# Patient Record
Sex: MALE | Race: BLACK OR AFRICAN AMERICAN | Employment: UNEMPLOYED
[De-identification: names, ages, dates, MRNs, and addresses within clinical notes are randomized per-mention and may not be internally consistent; named-entity substitution may affect disease eponyms.]

---

## 2024-05-17 ENCOUNTER — APPOINTMENT (OUTPATIENT)
Facility: HOSPITAL | Age: 64
End: 2024-05-17
Payer: MEDICAID

## 2024-05-17 ENCOUNTER — HOSPITAL ENCOUNTER (INPATIENT)
Facility: HOSPITAL | Age: 64
LOS: 7 days | Discharge: NURSING, REHAB OR LONG-TERM CARE | End: 2024-05-24
Attending: EMERGENCY MEDICINE | Admitting: FAMILY MEDICINE
Payer: MEDICAID

## 2024-05-17 DIAGNOSIS — S72.002A CLOSED LEFT HIP FRACTURE, INITIAL ENCOUNTER (HCC): Primary | ICD-10-CM

## 2024-05-17 DIAGNOSIS — R00.0 TACHYARRHYTHMIA: ICD-10-CM

## 2024-05-17 LAB
ALBUMIN SERPL-MCNC: 3.1 G/DL (ref 3.5–5)
ALBUMIN/GLOB SERPL: 0.6 (ref 1.1–2.2)
ALP SERPL-CCNC: 95 U/L (ref 45–117)
ALT SERPL-CCNC: 14 U/L (ref 12–78)
ANION GAP SERPL CALC-SCNC: 8 MMOL/L (ref 5–15)
ANION GAP SERPL CALC-SCNC: 9 MMOL/L (ref 5–15)
ANION GAP SERPL CALC-SCNC: 9 MMOL/L (ref 5–15)
APTT PPP: 29.3 SEC (ref 22.1–31)
AST SERPL-CCNC: 18 U/L (ref 15–37)
BASOPHILS # BLD: 0.1 K/UL (ref 0–0.1)
BASOPHILS NFR BLD: 2 % (ref 0–1)
BILIRUB SERPL-MCNC: 0.4 MG/DL (ref 0.2–1)
BUN SERPL-MCNC: 5 MG/DL (ref 6–20)
BUN SERPL-MCNC: 6 MG/DL (ref 6–20)
BUN SERPL-MCNC: 7 MG/DL (ref 6–20)
BUN/CREAT SERPL: 7 (ref 12–20)
BUN/CREAT SERPL: 8 (ref 12–20)
BUN/CREAT SERPL: 8 (ref 12–20)
CALCIUM SERPL-MCNC: 8 MG/DL (ref 8.5–10.1)
CALCIUM SERPL-MCNC: 8.2 MG/DL (ref 8.5–10.1)
CALCIUM SERPL-MCNC: 8.3 MG/DL (ref 8.5–10.1)
CHLORIDE SERPL-SCNC: 92 MMOL/L (ref 97–108)
CHLORIDE SERPL-SCNC: 93 MMOL/L (ref 97–108)
CHLORIDE SERPL-SCNC: 94 MMOL/L (ref 97–108)
CO2 SERPL-SCNC: 23 MMOL/L (ref 21–32)
CO2 SERPL-SCNC: 23 MMOL/L (ref 21–32)
CO2 SERPL-SCNC: 25 MMOL/L (ref 21–32)
COMMENT:: NORMAL
CREAT SERPL-MCNC: 0.73 MG/DL (ref 0.7–1.3)
CREAT SERPL-MCNC: 0.76 MG/DL (ref 0.7–1.3)
CREAT SERPL-MCNC: 0.91 MG/DL (ref 0.7–1.3)
DIFFERENTIAL METHOD BLD: ABNORMAL
EOSINOPHIL # BLD: 0.2 K/UL (ref 0–0.4)
EOSINOPHIL NFR BLD: 5 % (ref 0–7)
ERYTHROCYTE [DISTWIDTH] IN BLOOD BY AUTOMATED COUNT: 14.9 % (ref 11.5–14.5)
ETHANOL SERPL-MCNC: 240 MG/DL (ref 0–0.08)
GLOBULIN SER CALC-MCNC: 5.1 G/DL (ref 2–4)
GLUCOSE SERPL-MCNC: 118 MG/DL (ref 65–100)
GLUCOSE SERPL-MCNC: 118 MG/DL (ref 65–100)
GLUCOSE SERPL-MCNC: 123 MG/DL (ref 65–100)
HCT VFR BLD AUTO: 31.5 % (ref 36.6–50.3)
HGB BLD-MCNC: 11 G/DL (ref 12.1–17)
IMM GRANULOCYTES # BLD AUTO: 0 K/UL (ref 0–0.04)
IMM GRANULOCYTES NFR BLD AUTO: 1 % (ref 0–0.5)
INR PPP: 1 (ref 0.9–1.1)
LYMPHOCYTES # BLD: 1 K/UL (ref 0.8–3.5)
LYMPHOCYTES NFR BLD: 22 % (ref 12–49)
MCH RBC QN AUTO: 31.6 PG (ref 26–34)
MCHC RBC AUTO-ENTMCNC: 34.9 G/DL (ref 30–36.5)
MCV RBC AUTO: 90.5 FL (ref 80–99)
MONOCYTES # BLD: 0.4 K/UL (ref 0–1)
MONOCYTES NFR BLD: 9 % (ref 5–13)
NEUTS SEG # BLD: 2.7 K/UL (ref 1.8–8)
NEUTS SEG NFR BLD: 61 % (ref 32–75)
NRBC # BLD: 0 K/UL (ref 0–0.01)
NRBC BLD-RTO: 0 PER 100 WBC
OSMOLALITY SERPL: 317 MOSM/KG H2O
OSMOLALITY UR: 378 MOSM/KG H2O
PLATELET # BLD AUTO: 163 K/UL (ref 150–400)
PMV BLD AUTO: 9 FL (ref 8.9–12.9)
POTASSIUM SERPL-SCNC: 3.7 MMOL/L (ref 3.5–5.1)
POTASSIUM SERPL-SCNC: 3.7 MMOL/L (ref 3.5–5.1)
POTASSIUM SERPL-SCNC: 3.8 MMOL/L (ref 3.5–5.1)
PROT SERPL-MCNC: 8.2 G/DL (ref 6.4–8.2)
PROTHROMBIN TIME: 10.3 SEC (ref 9–11.1)
RBC # BLD AUTO: 3.48 M/UL (ref 4.1–5.7)
SODIUM SERPL-SCNC: 124 MMOL/L (ref 136–145)
SODIUM SERPL-SCNC: 126 MMOL/L (ref 136–145)
SODIUM SERPL-SCNC: 126 MMOL/L (ref 136–145)
SODIUM UR-SCNC: 70 MMOL/L
SPECIMEN HOLD: NORMAL
THERAPEUTIC RANGE: NORMAL SECS (ref 58–77)
TSH SERPL DL<=0.05 MIU/L-ACNC: 3.94 UIU/ML (ref 0.36–3.74)
WBC # BLD AUTO: 4.4 K/UL (ref 4.1–11.1)

## 2024-05-17 PROCEDURE — 2580000003 HC RX 258: Performed by: FAMILY MEDICINE

## 2024-05-17 PROCEDURE — 83935 ASSAY OF URINE OSMOLALITY: CPT

## 2024-05-17 PROCEDURE — 82077 ASSAY SPEC XCP UR&BREATH IA: CPT

## 2024-05-17 PROCEDURE — 70450 CT HEAD/BRAIN W/O DYE: CPT

## 2024-05-17 PROCEDURE — 80048 BASIC METABOLIC PNL TOTAL CA: CPT

## 2024-05-17 PROCEDURE — 86850 RBC ANTIBODY SCREEN: CPT

## 2024-05-17 PROCEDURE — 86921 COMPATIBILITY TEST INCUBATE: CPT

## 2024-05-17 PROCEDURE — 71045 X-RAY EXAM CHEST 1 VIEW: CPT

## 2024-05-17 PROCEDURE — 80053 COMPREHEN METABOLIC PANEL: CPT

## 2024-05-17 PROCEDURE — 96374 THER/PROPH/DIAG INJ IV PUSH: CPT

## 2024-05-17 PROCEDURE — 2500000003 HC RX 250 WO HCPCS: Performed by: FAMILY MEDICINE

## 2024-05-17 PROCEDURE — 2580000003 HC RX 258: Performed by: EMERGENCY MEDICINE

## 2024-05-17 PROCEDURE — 86900 BLOOD TYPING SEROLOGIC ABO: CPT

## 2024-05-17 PROCEDURE — 6360000002 HC RX W HCPCS: Performed by: EMERGENCY MEDICINE

## 2024-05-17 PROCEDURE — 86901 BLOOD TYPING SEROLOGIC RH(D): CPT

## 2024-05-17 PROCEDURE — 86922 COMPATIBILITY TEST ANTIGLOB: CPT

## 2024-05-17 PROCEDURE — 86920 COMPATIBILITY TEST SPIN: CPT

## 2024-05-17 PROCEDURE — 85025 COMPLETE CBC W/AUTO DIFF WBC: CPT

## 2024-05-17 PROCEDURE — 86902 BLOOD TYPE ANTIGEN DONOR EA: CPT

## 2024-05-17 PROCEDURE — 73502 X-RAY EXAM HIP UNI 2-3 VIEWS: CPT

## 2024-05-17 PROCEDURE — 86870 RBC ANTIBODY IDENTIFICATION: CPT

## 2024-05-17 PROCEDURE — 99285 EMERGENCY DEPT VISIT HI MDM: CPT

## 2024-05-17 PROCEDURE — APPNB15 APP NON BILLABLE TIME 0-15 MINS: Performed by: PHYSICIAN ASSISTANT

## 2024-05-17 PROCEDURE — 86880 COOMBS TEST DIRECT: CPT

## 2024-05-17 PROCEDURE — 6360000002 HC RX W HCPCS: Performed by: FAMILY MEDICINE

## 2024-05-17 PROCEDURE — 84300 ASSAY OF URINE SODIUM: CPT

## 2024-05-17 PROCEDURE — 73552 X-RAY EXAM OF FEMUR 2/>: CPT

## 2024-05-17 PROCEDURE — 85730 THROMBOPLASTIN TIME PARTIAL: CPT

## 2024-05-17 PROCEDURE — 6370000000 HC RX 637 (ALT 250 FOR IP): Performed by: FAMILY MEDICINE

## 2024-05-17 PROCEDURE — 83930 ASSAY OF BLOOD OSMOLALITY: CPT

## 2024-05-17 PROCEDURE — 86905 BLOOD TYPING RBC ANTIGENS: CPT

## 2024-05-17 PROCEDURE — 36415 COLL VENOUS BLD VENIPUNCTURE: CPT

## 2024-05-17 PROCEDURE — 6370000000 HC RX 637 (ALT 250 FOR IP): Performed by: PHYSICIAN ASSISTANT

## 2024-05-17 PROCEDURE — 6370000000 HC RX 637 (ALT 250 FOR IP): Performed by: ORTHOPAEDIC SURGERY

## 2024-05-17 PROCEDURE — 87040 BLOOD CULTURE FOR BACTERIA: CPT

## 2024-05-17 PROCEDURE — 2060000000 HC ICU INTERMEDIATE R&B

## 2024-05-17 PROCEDURE — 84443 ASSAY THYROID STIM HORMONE: CPT

## 2024-05-17 PROCEDURE — 85610 PROTHROMBIN TIME: CPT

## 2024-05-17 RX ORDER — LORAZEPAM 2 MG/ML
2 INJECTION INTRAMUSCULAR
Status: DISCONTINUED | OUTPATIENT
Start: 2024-05-17 | End: 2024-05-24 | Stop reason: HOSPADM

## 2024-05-17 RX ORDER — ONDANSETRON 4 MG/1
4 TABLET, ORALLY DISINTEGRATING ORAL EVERY 8 HOURS PRN
Status: DISCONTINUED | OUTPATIENT
Start: 2024-05-17 | End: 2024-05-24 | Stop reason: HOSPADM

## 2024-05-17 RX ORDER — POTASSIUM CHLORIDE 750 MG/1
40 TABLET, FILM COATED, EXTENDED RELEASE ORAL PRN
Status: DISCONTINUED | OUTPATIENT
Start: 2024-05-17 | End: 2024-05-24 | Stop reason: HOSPADM

## 2024-05-17 RX ORDER — DEXTROSE MONOHYDRATE, SODIUM CHLORIDE, AND POTASSIUM CHLORIDE 50; 1.49; 9 G/1000ML; G/1000ML; G/1000ML
INJECTION, SOLUTION INTRAVENOUS CONTINUOUS
Status: DISCONTINUED | OUTPATIENT
Start: 2024-05-17 | End: 2024-05-19

## 2024-05-17 RX ORDER — POTASSIUM CHLORIDE 7.45 MG/ML
10 INJECTION INTRAVENOUS PRN
Status: DISCONTINUED | OUTPATIENT
Start: 2024-05-17 | End: 2024-05-24 | Stop reason: HOSPADM

## 2024-05-17 RX ORDER — LORAZEPAM 1 MG/1
1 TABLET ORAL
Status: DISCONTINUED | OUTPATIENT
Start: 2024-05-17 | End: 2024-05-24 | Stop reason: HOSPADM

## 2024-05-17 RX ORDER — MORPHINE SULFATE 4 MG/ML
4 INJECTION, SOLUTION INTRAMUSCULAR; INTRAVENOUS
Status: COMPLETED | OUTPATIENT
Start: 2024-05-17 | End: 2024-05-17

## 2024-05-17 RX ORDER — MULTIVITAMIN WITH IRON
1 TABLET ORAL DAILY
Status: DISCONTINUED | OUTPATIENT
Start: 2024-05-17 | End: 2024-05-24 | Stop reason: HOSPADM

## 2024-05-17 RX ORDER — POLYETHYLENE GLYCOL 3350 17 G/17G
17 POWDER, FOR SOLUTION ORAL DAILY PRN
Status: DISCONTINUED | OUTPATIENT
Start: 2024-05-17 | End: 2024-05-24 | Stop reason: HOSPADM

## 2024-05-17 RX ORDER — SODIUM CHLORIDE 0.9 % (FLUSH) 0.9 %
5-40 SYRINGE (ML) INJECTION EVERY 12 HOURS SCHEDULED
Status: DISCONTINUED | OUTPATIENT
Start: 2024-05-17 | End: 2024-05-22

## 2024-05-17 RX ORDER — LORAZEPAM 1 MG/1
3 TABLET ORAL
Status: DISCONTINUED | OUTPATIENT
Start: 2024-05-17 | End: 2024-05-24 | Stop reason: HOSPADM

## 2024-05-17 RX ORDER — OXYCODONE HYDROCHLORIDE 5 MG/1
5 TABLET ORAL EVERY 4 HOURS PRN
Status: DISCONTINUED | OUTPATIENT
Start: 2024-05-17 | End: 2024-05-17

## 2024-05-17 RX ORDER — LIDOCAINE 4 G/G
1 PATCH TOPICAL DAILY
Status: DISCONTINUED | OUTPATIENT
Start: 2024-05-17 | End: 2024-05-24 | Stop reason: HOSPADM

## 2024-05-17 RX ORDER — 0.9 % SODIUM CHLORIDE 0.9 %
1000 INTRAVENOUS SOLUTION INTRAVENOUS ONCE
Status: COMPLETED | OUTPATIENT
Start: 2024-05-17 | End: 2024-05-17

## 2024-05-17 RX ORDER — OXYCODONE HYDROCHLORIDE 5 MG/1
5 TABLET ORAL EVERY 4 HOURS PRN
Status: DISCONTINUED | OUTPATIENT
Start: 2024-05-17 | End: 2024-05-24 | Stop reason: HOSPADM

## 2024-05-17 RX ORDER — SODIUM CHLORIDE 0.9 % (FLUSH) 0.9 %
5-40 SYRINGE (ML) INJECTION PRN
Status: DISCONTINUED | OUTPATIENT
Start: 2024-05-17 | End: 2024-05-24 | Stop reason: HOSPADM

## 2024-05-17 RX ORDER — SENNOSIDES A AND B 8.6 MG/1
1 TABLET, FILM COATED ORAL DAILY PRN
Status: DISCONTINUED | OUTPATIENT
Start: 2024-05-17 | End: 2024-05-24 | Stop reason: HOSPADM

## 2024-05-17 RX ORDER — ONDANSETRON 2 MG/ML
4 INJECTION INTRAMUSCULAR; INTRAVENOUS EVERY 6 HOURS PRN
Status: DISCONTINUED | OUTPATIENT
Start: 2024-05-17 | End: 2024-05-24 | Stop reason: HOSPADM

## 2024-05-17 RX ORDER — ACETAMINOPHEN 325 MG/1
650 TABLET ORAL EVERY 6 HOURS
Status: DISCONTINUED | OUTPATIENT
Start: 2024-05-17 | End: 2024-05-24 | Stop reason: HOSPADM

## 2024-05-17 RX ORDER — LORAZEPAM 2 MG/ML
1 INJECTION INTRAMUSCULAR
Status: DISCONTINUED | OUTPATIENT
Start: 2024-05-17 | End: 2024-05-24 | Stop reason: HOSPADM

## 2024-05-17 RX ORDER — MORPHINE SULFATE 4 MG/ML
4 INJECTION, SOLUTION INTRAMUSCULAR; INTRAVENOUS EVERY 4 HOURS PRN
Status: DISCONTINUED | OUTPATIENT
Start: 2024-05-17 | End: 2024-05-24 | Stop reason: HOSPADM

## 2024-05-17 RX ORDER — ACETAMINOPHEN 650 MG/1
650 SUPPOSITORY RECTAL EVERY 6 HOURS PRN
Status: DISCONTINUED | OUTPATIENT
Start: 2024-05-17 | End: 2024-05-24 | Stop reason: HOSPADM

## 2024-05-17 RX ORDER — FOLIC ACID 1 MG/1
1 TABLET ORAL DAILY
Status: DISCONTINUED | OUTPATIENT
Start: 2024-05-17 | End: 2024-05-24 | Stop reason: HOSPADM

## 2024-05-17 RX ORDER — LANOLIN ALCOHOL/MO/W.PET/CERES
100 CREAM (GRAM) TOPICAL DAILY
Status: DISCONTINUED | OUTPATIENT
Start: 2024-05-17 | End: 2024-05-24 | Stop reason: HOSPADM

## 2024-05-17 RX ORDER — LORAZEPAM 2 MG/ML
4 INJECTION INTRAMUSCULAR
Status: DISCONTINUED | OUTPATIENT
Start: 2024-05-17 | End: 2024-05-24 | Stop reason: HOSPADM

## 2024-05-17 RX ORDER — NALOXONE HYDROCHLORIDE 0.4 MG/ML
0.4 INJECTION, SOLUTION INTRAMUSCULAR; INTRAVENOUS; SUBCUTANEOUS PRN
Status: DISCONTINUED | OUTPATIENT
Start: 2024-05-17 | End: 2024-05-24 | Stop reason: HOSPADM

## 2024-05-17 RX ORDER — LORAZEPAM 1 MG/1
2 TABLET ORAL
Status: DISCONTINUED | OUTPATIENT
Start: 2024-05-17 | End: 2024-05-24 | Stop reason: HOSPADM

## 2024-05-17 RX ORDER — SODIUM CHLORIDE 9 MG/ML
INJECTION, SOLUTION INTRAVENOUS PRN
Status: DISCONTINUED | OUTPATIENT
Start: 2024-05-17 | End: 2024-05-20 | Stop reason: SDUPTHER

## 2024-05-17 RX ORDER — OXYCODONE HYDROCHLORIDE 5 MG/1
10 TABLET ORAL EVERY 4 HOURS PRN
Status: DISCONTINUED | OUTPATIENT
Start: 2024-05-17 | End: 2024-05-24 | Stop reason: HOSPADM

## 2024-05-17 RX ORDER — ACETAMINOPHEN 325 MG/1
650 TABLET ORAL EVERY 6 HOURS PRN
Status: DISCONTINUED | OUTPATIENT
Start: 2024-05-17 | End: 2024-05-24 | Stop reason: HOSPADM

## 2024-05-17 RX ORDER — MAGNESIUM SULFATE IN WATER 40 MG/ML
2000 INJECTION, SOLUTION INTRAVENOUS PRN
Status: DISCONTINUED | OUTPATIENT
Start: 2024-05-17 | End: 2024-05-24 | Stop reason: HOSPADM

## 2024-05-17 RX ORDER — LORAZEPAM 1 MG/1
4 TABLET ORAL
Status: DISCONTINUED | OUTPATIENT
Start: 2024-05-17 | End: 2024-05-24 | Stop reason: HOSPADM

## 2024-05-17 RX ORDER — LORAZEPAM 2 MG/ML
3 INJECTION INTRAMUSCULAR
Status: DISCONTINUED | OUTPATIENT
Start: 2024-05-17 | End: 2024-05-24 | Stop reason: HOSPADM

## 2024-05-17 RX ORDER — SODIUM CHLORIDE 9 MG/ML
INJECTION, SOLUTION INTRAVENOUS PRN
Status: DISCONTINUED | OUTPATIENT
Start: 2024-05-17 | End: 2024-05-24 | Stop reason: HOSPADM

## 2024-05-17 RX ADMIN — POTASSIUM CHLORIDE, DEXTROSE MONOHYDRATE AND SODIUM CHLORIDE: 150; 5; 900 INJECTION, SOLUTION INTRAVENOUS at 22:28

## 2024-05-17 RX ADMIN — OXYCODONE 10 MG: 5 TABLET ORAL at 22:12

## 2024-05-17 RX ADMIN — ONDANSETRON 4 MG: 2 INJECTION INTRAMUSCULAR; INTRAVENOUS at 21:51

## 2024-05-17 RX ADMIN — ACETAMINOPHEN 650 MG: 325 TABLET ORAL at 23:27

## 2024-05-17 RX ADMIN — POTASSIUM CHLORIDE, DEXTROSE MONOHYDRATE AND SODIUM CHLORIDE: 150; 5; 900 INJECTION, SOLUTION INTRAVENOUS at 07:18

## 2024-05-17 RX ADMIN — OXYCODONE 5 MG: 5 TABLET ORAL at 04:05

## 2024-05-17 RX ADMIN — SODIUM CHLORIDE, PRESERVATIVE FREE 10 ML: 5 INJECTION INTRAVENOUS at 22:15

## 2024-05-17 RX ADMIN — SODIUM CHLORIDE 1000 ML: 9 INJECTION, SOLUTION INTRAVENOUS at 02:47

## 2024-05-17 RX ADMIN — MORPHINE SULFATE 4 MG: 4 INJECTION, SOLUTION INTRAMUSCULAR; INTRAVENOUS at 01:14

## 2024-05-17 RX ADMIN — MORPHINE SULFATE 4 MG: 4 INJECTION, SOLUTION INTRAMUSCULAR; INTRAVENOUS at 13:27

## 2024-05-17 ASSESSMENT — PAIN - FUNCTIONAL ASSESSMENT
PAIN_FUNCTIONAL_ASSESSMENT: PREVENTS OR INTERFERES SOME ACTIVE ACTIVITIES AND ADLS
PAIN_FUNCTIONAL_ASSESSMENT: ACTIVITIES ARE NOT PREVENTED
PAIN_FUNCTIONAL_ASSESSMENT: 0-10
PAIN_FUNCTIONAL_ASSESSMENT: 0-10
PAIN_FUNCTIONAL_ASSESSMENT: PREVENTS OR INTERFERES SOME ACTIVE ACTIVITIES AND ADLS
PAIN_FUNCTIONAL_ASSESSMENT: 0-10
PAIN_FUNCTIONAL_ASSESSMENT: PREVENTS OR INTERFERES SOME ACTIVE ACTIVITIES AND ADLS

## 2024-05-17 ASSESSMENT — PAIN DESCRIPTION - ORIENTATION
ORIENTATION: LEFT
ORIENTATION: RIGHT
ORIENTATION: RIGHT
ORIENTATION: LEFT

## 2024-05-17 ASSESSMENT — PAIN DESCRIPTION - DESCRIPTORS
DESCRIPTORS: DISCOMFORT

## 2024-05-17 ASSESSMENT — PAIN SCALES - GENERAL
PAINLEVEL_OUTOF10: 6
PAINLEVEL_OUTOF10: 5
PAINLEVEL_OUTOF10: 9
PAINLEVEL_OUTOF10: 10
PAINLEVEL_OUTOF10: 4
PAINLEVEL_OUTOF10: 9
PAINLEVEL_OUTOF10: 10
PAINLEVEL_OUTOF10: 9
PAINLEVEL_OUTOF10: 8
PAINLEVEL_OUTOF10: 8

## 2024-05-17 ASSESSMENT — PAIN DESCRIPTION - FREQUENCY
FREQUENCY: CONTINUOUS

## 2024-05-17 ASSESSMENT — PAIN DESCRIPTION - ONSET
ONSET: ON-GOING
ONSET: SUDDEN
ONSET: SUDDEN

## 2024-05-17 ASSESSMENT — PAIN DESCRIPTION - LOCATION
LOCATION: HIP

## 2024-05-17 ASSESSMENT — PAIN DESCRIPTION - PAIN TYPE
TYPE: ACUTE PAIN

## 2024-05-17 ASSESSMENT — PAIN SCALES - WONG BAKER: WONGBAKER_NUMERICALRESPONSE: HURTS LITTLE MORE

## 2024-05-17 NOTE — ED TRIAGE NOTES
Pt arrives via EMS after being found in [facility]. States he is a resident there. Had a fall and states he has left hip pain. Alcoholic beverage discovered with patient by EMS on arrival to scene.

## 2024-05-17 NOTE — PROGRESS NOTES
Orthopedic Perfect Serve Consult Note    Date of Consultation:  May 17, 2024  Referring Physician:  MD Maco    Pt found to be intoxicated with a Left Femoral Neck Fracture in ED; No other injuries and NVI per ED provider; Will see in the morning for formal consult note.     - Plan is to proceed to OR with Dr. Merrill on 5/17 for Left Hip Hemiarthroplasty pending clearance by appropriate services.   - Preop workup ongoing for medical clearance. Pt will be admitted to Hospitalist given advanced age and co-morbidities.   - CBC, CMP, UA, Coags, T&S, EKG, Chest XR ordered for preop workup; addt'l pelvis, femur films ordered for surgical planning  - NPO (except meds with sips) now, Bedrest, NV checks q 4 hours  - Hold chem DVT ppx, SCDs  - Pain - Acetaminophen, Oxycodone, Morphine for breakthrough; Lido patch, Ice      Adryan LORENA Coronel  Orthopedic Trauma Service  [facility]

## 2024-05-17 NOTE — CONSULTS
Orthopaedic Inpatient Consultation  [facility]    Assessment:   Roland Bennett is a 64 y.o. year-old male with left femoral neck Fracture planning surgery    Plan:   -Weighbearing: NWB LLE  -DVT prophylaxis: SCDs, frequent ambulation, hold chemical anticoagulation for surgical planning  -Pain control: Continue as needed pain management, ice to operative area, elevate  -PT/OT for mobilization postoperatively  -Dispo:  -I discussed with the patient his diagnosis and the treatment options.  Because of the displaced fracture, I recommended surgical fixation by hemiarthroplasty.  He is a very complex situation with the presence of the retrograde nail.  This will require removal of the nail from the knee distally followed by the hemiarthroplasty of the hip joint.  We talked about the incapacitation that this diagnosis causes.  We then discussed the risks, benefits, complications, convalescence regarding the surgery.  We talked about the significant impact on healing, recovery and its potential burden on achieving the same level of independence.  We discussed continued pain, dislocation/instability, need for revision surgery, infection, limb length discrepancy, thromboembolic disease and the increased mortality rate both within 90 days and at 1 year as this is a marker of general condition.  I explained that in his situation and his alcoholism, this puts him at higher risk for difficulty controlling his pain around the time of surgery and high risk for complications such as infections, periprosthetic fractures and dislocations.  All the questions were answered.  The patient agreed to proceed forth with surgery  -Planning surgery 5/18/2024 at noon.  - A consent form was signed  - Appropriate surgical site marked  - Hold chemical anticoagulation for surgery  - pain control as needed  - NPO 8 hours prior to surgery     Subjective/History:     Roland Bennett is a 64 y.o. male  evaluated for left hip pain.  He fell in

## 2024-05-17 NOTE — ED PROVIDER NOTES
[facility] EMERGENCY DEP  EMERGENCY DEPARTMENT ENCOUNTER      Pt Name: Roland Bennett  MRN: [id number]  Birthdate 1960  Date of evaluation: 5/17/2024  Provider: Ashutosh Dewey MD    CHIEF COMPLAINT       Chief Complaint   Patient presents with    Fall         HISTORY OF PRESENT ILLNESS   (Location/Symptom, Timing/Onset, Context/Setting, Quality, Duration, Modifying Factors, Severity)  Note limiting factors.   64-year-old male presents with a parking lot at [employer] where he was found on the ground after a fall.  Patient states he had been drinking alcohol tonight.  States he had a trip and fall.  He injured his left hip but denies head injury or any other complaints.  Patient states that he was staying in [facility].  Denies any significant past medical history.  No history available in the EMR.    Paperwork obtained from [facility] show history significant for anemia, alcohol abuse, dysphagia, malnutrition, throat cancer, dysphagia.      The history is provided by the patient, medical records and the EMS personnel.         Review of External Medical Records:     Nursing Notes were reviewed.    REVIEW OF SYSTEMS    (2-9 systems for level 4, 10 or more for level 5)     Review of Systems   Constitutional:  Negative for fatigue.   HENT:  Negative for sore throat.    Eyes:  Negative for visual disturbance.   Respiratory:  Negative for shortness of breath.    Cardiovascular:  Negative for palpitations.   Gastrointestinal:  Negative for constipation.   Genitourinary:  Negative for difficulty urinating.   Musculoskeletal:  Negative for myalgias.   Skin:  Negative for rash.       Except as noted above the remainder of the review of systems was reviewed and negative.       PAST MEDICAL HISTORY   No past medical history on file.      SURGICAL HISTORY     No past surgical history on file.      CURRENT MEDICATIONS       Previous Medications    No medications on file       ALLERGIES     Patient has no known

## 2024-05-17 NOTE — H&P
History and Physical    Date of Service:  5/17/2024  Primary Care Provider: No primary care provider on file.  Source of information: patient, electronic medical record    Chief Complaint: Fall      History of Presenting Illness:   Roland Bennett is a 64 y.o. male with a past medical history of alcohol dependence, anemia, dysphagia, malnutrition, and throat cancer who presented after ground-level fall at [place].  He had been drinking alcohol tonight, and tripped and fell in the parking lot at [place], he injured his left hip, and is being admitted for left hip fracture.  He states that he does not drink daily, but did have 2 drinks consisting of a 40 ounce of beer, and a 24 ounce beers.    In the ED, vital signs stable.  Labs show ethanol 240, hemoglobin 11, and sodium 126.       REVIEW OF SYSTEMS:  A comprehensive review of systems was negative except for that written in the History of Present Illness.     No past medical history on file.   No past surgical history on file.  Prior to Admission medications    Not on File     No Known Allergies   No family history on file.   Social History:     Social Determinants of Health     Tobacco Use: Not on file   Alcohol Use: Not on file   Financial Resource Strain: Not on file   Food Insecurity: Not on file   Transportation Needs: Not on file   Physical Activity: Not on file   Stress: Not on file   Social Connections: Not on file   Intimate Partner Violence: Not on file   Depression: Not on file   Housing Stability: Not on file   Interpersonal Safety: Not on file   Utilities: Not on file        Medications were reconciled to the best of my ability given all available resources at the time of admission. Route is PO if not otherwise noted.     Family and social history were personally reviewed, all pertinent and relevant details are outlined as above.    Objective:   /81   Pulse 57   Resp 12   Ht 1.854 m (6' 1\")   Wt 80.7 kg (178 lb)   SpO2 94%    BMI 23.48 kg/m²         PHYSICAL EXAM:   General: Alert x oriented x 3, awake, no acute distress,   HEENT: PEERL, EOMI, moist mucus membranes  Neck: Supple, no JVD, no meningeal signs  Chest: Clear to auscultation bilaterally   CVS: RRR, S1 S2 heard, no murmurs/rubs/gallops  Abd: Soft, non-tender, non-distended, +bowel sounds   Ext: No clubbing, no cyanosis, no edema  Neuro/Psych: Pleasant mood and affect, CN 2-12 grossly intact, sensory grossly within normal limit, Strength 5/5 in all extremities  Cap refill: Brisk, less than 3 seconds  Pulses: 2+ radial pulses  Skin: Warm, dry, without rashes or lesions    Data Review:   I have independently reviewed and interpreted patient's lab and all other diagnostic data    Abnormal Labs Reviewed   CBC WITH AUTO DIFFERENTIAL - Abnormal; Notable for the following components:       Result Value    RBC 3.48 (*)     Hemoglobin 11.0 (*)     Hematocrit 31.5 (*)     RDW 14.9 (*)     Basophils % 2 (*)     Immature Granulocytes % 1 (*)     All other components within normal limits   COMPREHENSIVE METABOLIC PANEL - Abnormal; Notable for the following components:    Sodium 126 (*)     Chloride 93 (*)     Glucose 118 (*)     BUN/Creatinine Ratio 8 (*)     Calcium 8.3 (*)     Albumin 3.1 (*)     Globulin 5.1 (*)     Albumin/Globulin Ratio 0.6 (*)     All other components within normal limits   ETHANOL - Abnormal; Notable for the following components:    Ethanol Lvl 240 (*)     All other components within normal limits       Results       ** No results found for the last 336 hours. **             IMAGING:   XR HIP 2-3 VW W PELVIS LEFT   Final Result   Acute varus angulated intertrochanteric left femur fracture.      XR CHEST 1 VIEW   Final Result   No acute findings.           ECG/ECHO:    No results found for this or any previous visit (from the past 4464 hour(s)).  No results found for this or any previous visit.        Notes reviewed from all clinical/nonclinical/nursing services

## 2024-05-17 NOTE — ED NOTES
TRANSFER - OUT REPORT:    Verbal report given to SILVIANO Fishman on Roland Bennett  being transferred to [address] [place] [address] for routine progression of patient care       Report consisted of patient's Situation, Background, Assessment and   Recommendations(SBAR).     Information from the following report(s) Nurse Handoff Report, Index, ED Encounter Summary, ED SBAR, Adult Overview, MAR, and Recent Results was reviewed with the receiving nurse.    [place] Fall Assessment:    Presents to emergency department  because of falls (Syncope, seizure, or loss of consciousness): Yes  Age > 70: No  Altered Mental Status, Intoxication with alcohol or substance confusion (Disorientation, impaired judgment, poor safety awaremess, or inability to follow instructions): Yes  Impaired Mobility: Ambulates or transfers with assistive devices or assistance; Unable to ambulate or transer.: Yes  Nursing Judgement: Yes          Lines:   Implantable Port 05/17/24 Left Subclavian (Active)   Port-a-Cath Status Not Accessed 05/17/24 0045   Site Assessment Clean, dry & intact 05/17/24 0045        Opportunity for questions and clarification was provided.      Patient transported with:  Tech

## 2024-05-17 NOTE — DISCHARGE INSTRUCTIONS
Dr. Merrill's Hip Fracture treated by Hemiarthroplasty Postoperative Instructions    Follow-Up Appointment:  Follow up in the office 2 weeks after surgery.  To schedule an appointment, please call our office at [phone], extension [phone].   Activity:  Application of ice regularly will prevent inflammation and reduce pain and swelling.  You should ice the area as much as tolerated, but at least 3 times per day for 20-30 minutes.  Unless you have been specifically instructed otherwise, you can advance your activity as tolerated.   Ambulate every hour. Use your incentive spirometer every half hour when resting.  Perform your exercises as often as possible, at least 3 times per day.  Avoid extremes of motion and vigorous activities.  Hip Precautions: Avoid planting your operative foot and rotating (turning) at the hip. Also, keep your toes pointing relatively straightforward without excessive inward or outward turn. Avoid bringing your knee past your belly button.   Avoid low seats, recliners, and bleachers for the first 6-8 weeks  You may bear full weight on your operative extremity with a walker and transition to a cane as soon as you feel comfortable and strong enough.  Advance your activity in a stepwise and cautious manner.  Refrain from any high-level activities until we see you back at your follow up appointment. This includes golfing, exercising, and other more intense activities.  Prevent any falls. Clear your living environment of rugs or floor objects that may be tripping hazards. Use an assistive device as needed for balance and support.  Dressings / Wound Care:  Keep dressing in place until the follow-up visit.   Do not apply antibiotic ointment, creams, or lotions to your incision.  Most incisions are closed with absorbable sutures, but if not, the sutures or staples will be removed at your 2 week follow up.  Dermabond (skin glue) may be used to help close the incision, which appears as a thin,  you have questions or concerns, please call Dr. Merrill's staff    Office: Phone [phone], ext [phone]  Fax [phone]    Office Address: Flakito Merrill M.D.    [place] [id number]    Flakito Merrill MD      05/17/24         PREVENA PLUS incisional dressing management:    -This is an INCISIONAL “band-aid” under suction - no open wound and easily peels off at end of therapy.    -Dressing stays on for 7 days days with uninterrupted therapy; stays under suction 24/7 - do NOT turn it off.    -The pump has a rechargeable battery like a cell phone and must be plugged in to recharge.      -The [place] of lights will count down the days of therapy before the pump turns off automatically.     -At end of therapy, remove the dressing and cover incision with dry gauze until follow up appt with physician; dispose of dressing and pump.    -An extra canister was provided and can be exchanged when needed.    -You may add tape to the outside edge if it rolls up.     -Patient may shower with dressing - face the water stream; blot dressing dry with towel rather than rubbing which may roll up tape edges.    -Please reach out to provider/surgeon's office for concerns with incision.

## 2024-05-17 NOTE — CARE COORDINATION
Care Management Initial Assessment       RUR:  Readmission? No  1st IM letter given? No  1st  letter given: No     05/17/24 1121   Service Assessment   Patient Orientation Alert and Oriented   Cognition Alert   History Provided By Patient   Primary Caregiver Other (Comment)  (resides in [facility])   PCP Verified by CM Yes   Last Visit to PCP Within last 3 months   Prior Functional Level Assistance with the following:;Mobility   Current Functional Level Assistance with the following:;Mobility   Can patient return to prior living arrangement Yes   Ability to make needs known: Good   Would you like for me to discuss the discharge plan with any other family members/significant others, and if so, who? No   Social/Functional History   Lives With Other (comment)  (LTC at [facility])   Type of Home Facility   Bathroom Accessibility Walker accessible   Receives Help From Other (comment)  (staff at the facility)   Ambulation Assistance Needs assistance   Transfer Assistance Needs assistance  (walker)   Active  No   Occupation On disability   Discharge Planning   Type of Residence Long-Term Care;Other (Comment)  ([facility])   Living Arrangements Spouse/Significant Other  (Racheal Ramos-girlfriend [phone])   Potential Assistance Purchasing Medications No   Patient expects to be discharged to: Long-term care   History of falls? 1   Services At/After Discharge   Transition of Care Consult (CM Consult) Long Term Care   Mode of Transport at Discharge  Van   Confirm Follow Up Transport Self   Condition of Participation: Discharge Planning   The Patient and/or Patient Representative was provided with a Choice of Provider? Patient   The Patient and/Or Patient Representative agree with the Discharge Plan? Yes   Freedom of Choice list was provided with basic dialogue that supports the patient's individualized plan of care/goals, treatment preferences, and shares the quality data associated with the  providers?  Yes     Admitted after taking a fail in the parking lot of [facility]. He resides in the facility in LTC. Patient reports having drank alcohol prior to fall. CM confirmed with admissions at the facility that he has been there for at least 2 years in LTC. Emergency contact in Racheal Ramos, girlfriend [phone].   Has fractured left hip and plan for surgical repair today 5/17/24. CM following for ERIKA needs.

## 2024-05-17 NOTE — PROGRESS NOTES
[facility]                                                                                          Hospitalist Progress Note  Yesica Romero PA-C  Answering service: [phone] OR [phone] from in house phone        Date of Service:  2024  NAME:  Roland Bennett  :  1960  MRN:  [id number]       Admission Summary:   Roland Bennett is a 64 y.o. male with a past medical history of alcohol dependence, anemia, dysphagia, malnutrition, and throat cancer who presented after ground-level fall at [facility].  Patient had been drinking alcohol, tripped and fell in the parking lot at [facility], and injured his left hip. Upon arrival to the ED, he was found to have a left hip fracture. He states that he does not drink daily, but did have 2 drinks consisting of a 40 ounce of beer, and a 24 ounce beers.     In the ED, vital signs stable. Labs show ethanol 240, hemoglobin 11, and sodium 126.    Interval history / Subjective:   Upon seeing the patient on rounds, he is laying back in bed sleeping. Patient was easily aroused. Patient states he feels \"pretty good but I broke my hip!\" Patient states he has sharp intermittent pain in his hip, but generally is comfortable. Patient states his port he received when he had radiation and chemo for his throat cancer. Patient denies any fevers, chills and abdominal pain.     Assessment & Plan:      Left Femoral Neck Fracture  -Status post ground-level fall while intoxicated with alcohol  -Ortho following: plan to proceed to OR with Dr. Merrill on  for Left Hip Hemiarthroplasty  -nocontrast CT head  -NPO  -Bedrest  -Pain control: Acetaminophen, Oxycodone, Morphine for breakthrough; Lido patch, Ice  -Neurovascular checks q4hrs  -At present, there are no medical concerns that would keep patient from being amenable to surgery.     ETOH Dependence  -Alcohol level 240  -CIWA with as needed Ativan  -Thiamine plus folate plus MVI

## 2024-05-17 NOTE — PROGRESS NOTES
0542: patient arrived to floor, port accessed in ER, messaged Dr. Whitaker for order or instruction to deaccess, not sure if patient is an accurate historian for admit questions based on conversation upon transfer    0550: Xray needing nursing to leave for multiple images    0610: confirmed with Dr. Whitaker that port use is okay,  asked nurse to place order and she would cosign, I placed the order to the best of my knowledge. *hospitalists please update if it is incorrect or if your opinion differs on use*    0646: Prioritizing meds and labs, not sure if patient is sober/able to be an accurate source for admit questions currently, leaving admission questions for day shift.    0710: patient refusing PO medications/he is unwilling to raise head of bed to take PO meds, I am not confident nor the patient in him not choking on PO meds supine.    0720: patient not really cooperating with questions, \"I'm sleeping man\" and then ignoring/sleeping again

## 2024-05-17 NOTE — PROGRESS NOTES
Bedside and Verbal shift change report given to Jen (oncoming nurse) by Cristina (offgoing nurse). Report included the following information Nurse Handoff Report, Index, Intake/Output, MAR, and Cardiac Rhythm SR .

## 2024-05-17 NOTE — CONSULTS
ORTHOPEDIC SURGERY CONSULT    Subjective:     Date of Consultation:  May 17, 2024    Roland Bennett is a 64 y.o. male who is being seen for left hip fracture. Injury occurred in the early morning hours today. He tells me he was drinking heavily and fell onto his left side. He denies any other injuries. Denies head trauma/LOC during injury. Xrays of the left femur show a displaced left femoral neck fracture. He has a long intramedullary nail already in the left femur as a result of a femoral shaft fracture when he was a teenager. He reports fracturing his contralateral hip about 3 years ago. At baseline he walks without assistive device. He reports no significant medical issues and takes no medication regularly.    Patient Active Problem List    Diagnosis Date Noted    Closed left hip fracture, initial encounter ([facility]) 05/17/2024     No family history on file.   Social History     Tobacco Use    Smoking status: Never    Smokeless tobacco: Never   Substance Use Topics    Alcohol use: Yes     No past medical history on file.   No past surgical history on file.   Prior to Admission medications    Not on File     Current Facility-Administered Medications   Medication Dose Route Frequency    sodium chloride flush 0.9 % injection 5-40 mL  5-40 mL IntraVENous 2 times per day    sodium chloride flush 0.9 % injection 5-40 mL  5-40 mL IntraVENous PRN    0.9 % sodium chloride infusion   IntraVENous PRN    potassium chloride (KLOR-CON) extended release tablet 40 mEq  40 mEq Oral PRN    Or    potassium bicarb-citric acid (EFFER-K) effervescent tablet 40 mEq  40 mEq Oral PRN    Or    potassium chloride 10 mEq/100 mL IVPB (Peripheral Line)  10 mEq IntraVENous PRN    magnesium sulfate 2000 mg in 50 mL IVPB premix  2,000 mg IntraVENous PRN    ondansetron (ZOFRAN-ODT) disintegrating tablet 4 mg  4 mg Oral Q8H PRN    Or    ondansetron (ZOFRAN) injection 4 mg  4 mg IntraVENous Q6H PRN    polyethylene glycol (GLYCOLAX) packet 17 g  17 g  Eosinophils Absolute 0.2 0.0 - 0.4 K/UL    Basophils Absolute 0.1 0.0 - 0.1 K/UL    Immature Granulocytes Absolute 0.0 0.00 - 0.04 K/UL    Differential Type AUTOMATED     CMP    Collection Time: 05/17/24  1:17 AM   Result Value Ref Range    Sodium 126 (L) 136 - 145 mmol/L    Potassium 3.8 3.5 - 5.1 mmol/L    Chloride 93 (L) 97 - 108 mmol/L    CO2 25 21 - 32 mmol/L    Anion Gap 8 5 - 15 mmol/L    Glucose 118 (H) 65 - 100 mg/dL    BUN 7 6 - 20 MG/DL    Creatinine 0.91 0.70 - 1.30 MG/DL    BUN/Creatinine Ratio 8 (L) 12 - 20      Est, Glom Filt Rate >90 >60 ml/min/1.73m2    Calcium 8.3 (L) 8.5 - 10.1 MG/DL    Total Bilirubin 0.4 0.2 - 1.0 MG/DL    ALT 14 12 - 78 U/L    AST 18 15 - 37 U/L    Alk Phosphatase 95 45 - 117 U/L    Total Protein 8.2 6.4 - 8.2 g/dL    Albumin 3.1 (L) 3.5 - 5.0 g/dL    Globulin 5.1 (H) 2.0 - 4.0 g/dL    Albumin/Globulin Ratio 0.6 (L) 1.1 - 2.2     Protime-INR    Collection Time: 05/17/24  1:17 AM   Result Value Ref Range    INR 1.0 0.9 - 1.1      Protime 10.3 9.0 - 11.1 sec   Osmolality    Collection Time: 05/17/24  1:17 AM   Result Value Ref Range    Serum Osmolality 317 mOsm/kg H2O   TSH    Collection Time: 05/17/24  1:17 AM   Result Value Ref Range    TSH, 3rd Generation 3.94 (H) 0.36 - 3.74 uIU/mL   Sodium, urine, random    Collection Time: 05/17/24  6:11 AM   Result Value Ref Range    SODIUM, RANDOM URINE 70 MMOL/L   Osmolality, Urine    Collection Time: 05/17/24  6:11 AM   Result Value Ref Range    Osmolality, Ur 378 MOSM/kg H2O   Basic Metabolic Panel w/ Reflex to MG    Collection Time: 05/17/24  6:11 AM   Result Value Ref Range    Sodium 126 (L) 136 - 145 mmol/L    Potassium 3.7 3.5 - 5.1 mmol/L    Chloride 94 (L) 97 - 108 mmol/L    CO2 23 21 - 32 mmol/L    Anion Gap 9 5 - 15 mmol/L    Glucose 123 (H) 65 - 100 mg/dL    BUN 6 6 - 20 MG/DL    Creatinine 0.76 0.70 - 1.30 MG/DL    BUN/Creatinine Ratio 8 (L) 12 - 20      Est, Glom Filt Rate >90 >60 ml/min/1.73m2    Calcium 8.0 (L) 8.5 - 10.1

## 2024-05-18 ENCOUNTER — ANESTHESIA (OUTPATIENT)
Facility: HOSPITAL | Age: 64
End: 2024-05-18
Payer: MEDICAID

## 2024-05-18 ENCOUNTER — APPOINTMENT (OUTPATIENT)
Facility: HOSPITAL | Age: 64
End: 2024-05-18
Payer: MEDICAID

## 2024-05-18 ENCOUNTER — ANESTHESIA EVENT (OUTPATIENT)
Facility: HOSPITAL | Age: 64
End: 2024-05-18
Payer: MEDICAID

## 2024-05-18 LAB
ANION GAP SERPL CALC-SCNC: 4 MMOL/L (ref 5–15)
ANION GAP SERPL CALC-SCNC: 5 MMOL/L (ref 5–15)
BASOPHILS # BLD: 0.1 K/UL (ref 0–0.1)
BASOPHILS NFR BLD: 1 % (ref 0–1)
BUN SERPL-MCNC: 7 MG/DL (ref 6–20)
BUN SERPL-MCNC: 8 MG/DL (ref 6–20)
BUN/CREAT SERPL: 9 (ref 12–20)
BUN/CREAT SERPL: 9 (ref 12–20)
CALCIUM SERPL-MCNC: 8.2 MG/DL (ref 8.5–10.1)
CALCIUM SERPL-MCNC: 8.3 MG/DL (ref 8.5–10.1)
CHLORIDE SERPL-SCNC: 100 MMOL/L (ref 97–108)
CHLORIDE SERPL-SCNC: 98 MMOL/L (ref 97–108)
CO2 SERPL-SCNC: 23 MMOL/L (ref 21–32)
CO2 SERPL-SCNC: 25 MMOL/L (ref 21–32)
CREAT SERPL-MCNC: 0.76 MG/DL (ref 0.7–1.3)
CREAT SERPL-MCNC: 0.85 MG/DL (ref 0.7–1.3)
DIFFERENTIAL METHOD BLD: ABNORMAL
EOSINOPHIL # BLD: 0.2 K/UL (ref 0–0.4)
EOSINOPHIL NFR BLD: 3 % (ref 0–7)
ERYTHROCYTE [DISTWIDTH] IN BLOOD BY AUTOMATED COUNT: 14.6 % (ref 11.5–14.5)
GLUCOSE SERPL-MCNC: 117 MG/DL (ref 65–100)
GLUCOSE SERPL-MCNC: 120 MG/DL (ref 65–100)
HCT VFR BLD AUTO: 34.1 % (ref 36.6–50.3)
HGB BLD-MCNC: 11.7 G/DL (ref 12.1–17)
IMM GRANULOCYTES # BLD AUTO: 0.1 K/UL (ref 0–0.04)
IMM GRANULOCYTES NFR BLD AUTO: 1 % (ref 0–0.5)
LYMPHOCYTES # BLD: 0.5 K/UL (ref 0.8–3.5)
LYMPHOCYTES NFR BLD: 9 % (ref 12–49)
MCH RBC QN AUTO: 31.1 PG (ref 26–34)
MCHC RBC AUTO-ENTMCNC: 34.3 G/DL (ref 30–36.5)
MCV RBC AUTO: 90.7 FL (ref 80–99)
MONOCYTES # BLD: 0.6 K/UL (ref 0–1)
MONOCYTES NFR BLD: 10 % (ref 5–13)
NEUTS SEG # BLD: 4.2 K/UL (ref 1.8–8)
NEUTS SEG NFR BLD: 76 % (ref 32–75)
NRBC # BLD: 0 K/UL (ref 0–0.01)
NRBC BLD-RTO: 0 PER 100 WBC
PLATELET # BLD AUTO: 140 K/UL (ref 150–400)
PMV BLD AUTO: 9.7 FL (ref 8.9–12.9)
POTASSIUM SERPL-SCNC: 4.4 MMOL/L (ref 3.5–5.1)
POTASSIUM SERPL-SCNC: 4.4 MMOL/L (ref 3.5–5.1)
RBC # BLD AUTO: 3.76 M/UL (ref 4.1–5.7)
RBC MORPH BLD: ABNORMAL
SODIUM SERPL-SCNC: 126 MMOL/L (ref 136–145)
SODIUM SERPL-SCNC: 129 MMOL/L (ref 136–145)
WBC # BLD AUTO: 5.7 K/UL (ref 4.1–11.1)

## 2024-05-18 PROCEDURE — 2580000003 HC RX 258: Performed by: ORTHOPAEDIC SURGERY

## 2024-05-18 PROCEDURE — 2580000003 HC RX 258: Performed by: PHYSICIAN ASSISTANT

## 2024-05-18 PROCEDURE — 72170 X-RAY EXAM OF PELVIS: CPT

## 2024-05-18 PROCEDURE — 85025 COMPLETE CBC W/AUTO DIFF WBC: CPT

## 2024-05-18 PROCEDURE — 3700000000 HC ANESTHESIA ATTENDED CARE: Performed by: ORTHOPAEDIC SURGERY

## 2024-05-18 PROCEDURE — 3600000015 HC SURGERY LEVEL 5 ADDTL 15MIN: Performed by: ORTHOPAEDIC SURGERY

## 2024-05-18 PROCEDURE — 6370000000 HC RX 637 (ALT 250 FOR IP): Performed by: PHYSICIAN ASSISTANT

## 2024-05-18 PROCEDURE — 36415 COLL VENOUS BLD VENIPUNCTURE: CPT

## 2024-05-18 PROCEDURE — C9290 INJ, BUPIVACAINE LIPOSOME: HCPCS | Performed by: ORTHOPAEDIC SURGERY

## 2024-05-18 PROCEDURE — 6360000002 HC RX W HCPCS: Performed by: ORTHOPAEDIC SURGERY

## 2024-05-18 PROCEDURE — 2580000003 HC RX 258: Performed by: FAMILY MEDICINE

## 2024-05-18 PROCEDURE — 7100000000 HC PACU RECOVERY - FIRST 15 MIN: Performed by: ORTHOPAEDIC SURGERY

## 2024-05-18 PROCEDURE — 80048 BASIC METABOLIC PNL TOTAL CA: CPT

## 2024-05-18 PROCEDURE — 6360000002 HC RX W HCPCS: Performed by: PHYSICIAN ASSISTANT

## 2024-05-18 PROCEDURE — C1776 JOINT DEVICE (IMPLANTABLE): HCPCS | Performed by: ORTHOPAEDIC SURGERY

## 2024-05-18 PROCEDURE — 2720000010 HC SURG SUPPLY STERILE: Performed by: ORTHOPAEDIC SURGERY

## 2024-05-18 PROCEDURE — 2500000003 HC RX 250 WO HCPCS: Performed by: NURSE ANESTHETIST, CERTIFIED REGISTERED

## 2024-05-18 PROCEDURE — 3700000001 HC ADD 15 MINUTES (ANESTHESIA): Performed by: ORTHOPAEDIC SURGERY

## 2024-05-18 PROCEDURE — 2060000000 HC ICU INTERMEDIATE R&B

## 2024-05-18 PROCEDURE — 0QP704Z REMOVAL OF INTERNAL FIXATION DEVICE FROM LEFT UPPER FEMUR, OPEN APPROACH: ICD-10-PCS | Performed by: ORTHOPAEDIC SURGERY

## 2024-05-18 PROCEDURE — 2709999900 HC NON-CHARGEABLE SUPPLY: Performed by: ORTHOPAEDIC SURGERY

## 2024-05-18 PROCEDURE — 3600000005 HC SURGERY LEVEL 5 BASE: Performed by: ORTHOPAEDIC SURGERY

## 2024-05-18 PROCEDURE — 6370000000 HC RX 637 (ALT 250 FOR IP): Performed by: FAMILY MEDICINE

## 2024-05-18 PROCEDURE — 0SRS0JZ REPLACEMENT OF LEFT HIP JOINT, FEMORAL SURFACE WITH SYNTHETIC SUBSTITUTE, OPEN APPROACH: ICD-10-PCS | Performed by: ORTHOPAEDIC SURGERY

## 2024-05-18 PROCEDURE — 6360000002 HC RX W HCPCS: Performed by: NURSE ANESTHETIST, CERTIFIED REGISTERED

## 2024-05-18 PROCEDURE — 7100000001 HC PACU RECOVERY - ADDTL 15 MIN: Performed by: ORTHOPAEDIC SURGERY

## 2024-05-18 PROCEDURE — 6370000000 HC RX 637 (ALT 250 FOR IP): Performed by: ORTHOPAEDIC SURGERY

## 2024-05-18 PROCEDURE — 2580000003 HC RX 258: Performed by: NURSE ANESTHETIST, CERTIFIED REGISTERED

## 2024-05-18 DEVICE — IMPL CAPPED H6 HEMI UNI/BIPOLAR DEPUYSYNTHES: Type: IMPLANTABLE DEVICE | Site: HIP | Status: FUNCTIONAL

## 2024-05-18 DEVICE — SELF CENTERING BI-POLAR HEAD 28MM ID 53MM OD
Type: IMPLANTABLE DEVICE | Site: HIP | Status: FUNCTIONAL
Brand: SELF CENTERING

## 2024-05-18 DEVICE — ARTICUL/EZE FEMORAL HEAD DIAMETER 28MM +8.5 12/14 TAPER
Type: IMPLANTABLE DEVICE | Site: HIP | Status: FUNCTIONAL
Brand: ARTICUL/EZE

## 2024-05-18 DEVICE — ACTIS DUOFIX HIP PROSTHESIS (FEMORAL STEM 12/14 TAPER CEMENTLESS SIZE 11 STD COLLAR)  CE
Type: IMPLANTABLE DEVICE | Site: HIP | Status: FUNCTIONAL
Brand: ACTIS

## 2024-05-18 RX ORDER — 0.9 % SODIUM CHLORIDE 0.9 %
500 INTRAVENOUS SOLUTION INTRAVENOUS ONCE
Status: CANCELLED | OUTPATIENT
Start: 2024-05-18 | End: 2024-05-18

## 2024-05-18 RX ORDER — FENTANYL CITRATE 50 UG/ML
25 INJECTION, SOLUTION INTRAMUSCULAR; INTRAVENOUS EVERY 5 MIN PRN
Status: DISCONTINUED | OUTPATIENT
Start: 2024-05-18 | End: 2024-05-18 | Stop reason: HOSPADM

## 2024-05-18 RX ORDER — SODIUM CHLORIDE 9 MG/ML
INJECTION, SOLUTION INTRAVENOUS PRN
Status: CANCELLED | OUTPATIENT
Start: 2024-05-18

## 2024-05-18 RX ORDER — DEXAMETHASONE SODIUM PHOSPHATE 4 MG/ML
INJECTION, SOLUTION INTRA-ARTICULAR; INTRALESIONAL; INTRAMUSCULAR; INTRAVENOUS; SOFT TISSUE PRN
Status: DISCONTINUED | OUTPATIENT
Start: 2024-05-18 | End: 2024-05-18 | Stop reason: SDUPTHER

## 2024-05-18 RX ORDER — SODIUM CHLORIDE 0.9 % (FLUSH) 0.9 %
5-40 SYRINGE (ML) INJECTION PRN
Status: DISCONTINUED | OUTPATIENT
Start: 2024-05-18 | End: 2024-05-18 | Stop reason: HOSPADM

## 2024-05-18 RX ORDER — HYDROXYZINE HYDROCHLORIDE 10 MG/1
10 TABLET, FILM COATED ORAL EVERY 8 HOURS PRN
Status: CANCELLED | OUTPATIENT
Start: 2024-05-18

## 2024-05-18 RX ORDER — SUCCINYLCHOLINE/SOD CL,ISO/PF 200MG/10ML
SYRINGE (ML) INTRAVENOUS PRN
Status: DISCONTINUED | OUTPATIENT
Start: 2024-05-18 | End: 2024-05-18 | Stop reason: SDUPTHER

## 2024-05-18 RX ORDER — ONDANSETRON 2 MG/ML
4 INJECTION INTRAMUSCULAR; INTRAVENOUS EVERY 6 HOURS PRN
Status: CANCELLED | OUTPATIENT
Start: 2024-05-18

## 2024-05-18 RX ORDER — OXYCODONE HYDROCHLORIDE 5 MG/1
5 TABLET ORAL
Status: DISCONTINUED | OUTPATIENT
Start: 2024-05-18 | End: 2024-05-18 | Stop reason: HOSPADM

## 2024-05-18 RX ORDER — PROCHLORPERAZINE EDISYLATE 5 MG/ML
5 INJECTION INTRAMUSCULAR; INTRAVENOUS
Status: DISCONTINUED | OUTPATIENT
Start: 2024-05-18 | End: 2024-05-18 | Stop reason: HOSPADM

## 2024-05-18 RX ORDER — HYDROMORPHONE HYDROCHLORIDE 1 MG/ML
0.5 INJECTION, SOLUTION INTRAMUSCULAR; INTRAVENOUS; SUBCUTANEOUS EVERY 5 MIN PRN
Status: DISCONTINUED | OUTPATIENT
Start: 2024-05-18 | End: 2024-05-18 | Stop reason: HOSPADM

## 2024-05-18 RX ORDER — HYDRALAZINE HYDROCHLORIDE 20 MG/ML
10 INJECTION INTRAMUSCULAR; INTRAVENOUS
Status: DISCONTINUED | OUTPATIENT
Start: 2024-05-18 | End: 2024-05-18 | Stop reason: HOSPADM

## 2024-05-18 RX ORDER — ENOXAPARIN SODIUM 100 MG/ML
40 INJECTION SUBCUTANEOUS DAILY
Status: CANCELLED | OUTPATIENT
Start: 2024-05-18

## 2024-05-18 RX ORDER — FENTANYL CITRATE 50 UG/ML
INJECTION, SOLUTION INTRAMUSCULAR; INTRAVENOUS PRN
Status: DISCONTINUED | OUTPATIENT
Start: 2024-05-18 | End: 2024-05-18 | Stop reason: SDUPTHER

## 2024-05-18 RX ORDER — PHENYLEPHRINE HCL IN 0.9% NACL 0.4MG/10ML
SYRINGE (ML) INTRAVENOUS PRN
Status: DISCONTINUED | OUTPATIENT
Start: 2024-05-18 | End: 2024-05-18 | Stop reason: SDUPTHER

## 2024-05-18 RX ORDER — VANCOMYCIN HYDROCHLORIDE 1 G/20ML
INJECTION, POWDER, LYOPHILIZED, FOR SOLUTION INTRAVENOUS PRN
Status: DISCONTINUED | OUTPATIENT
Start: 2024-05-18 | End: 2024-05-18 | Stop reason: HOSPADM

## 2024-05-18 RX ORDER — ONDANSETRON 2 MG/ML
INJECTION INTRAMUSCULAR; INTRAVENOUS PRN
Status: DISCONTINUED | OUTPATIENT
Start: 2024-05-18 | End: 2024-05-18 | Stop reason: SDUPTHER

## 2024-05-18 RX ORDER — POLYETHYLENE GLYCOL 3350 17 G/17G
17 POWDER, FOR SOLUTION ORAL DAILY
Status: CANCELLED | OUTPATIENT
Start: 2024-05-18

## 2024-05-18 RX ORDER — SODIUM CHLORIDE 0.9 % (FLUSH) 0.9 %
5-40 SYRINGE (ML) INJECTION EVERY 12 HOURS SCHEDULED
Status: DISCONTINUED | OUTPATIENT
Start: 2024-05-18 | End: 2024-05-18 | Stop reason: HOSPADM

## 2024-05-18 RX ORDER — ONDANSETRON 2 MG/ML
4 INJECTION INTRAMUSCULAR; INTRAVENOUS
Status: DISCONTINUED | OUTPATIENT
Start: 2024-05-18 | End: 2024-05-18 | Stop reason: HOSPADM

## 2024-05-18 RX ORDER — NALOXONE HYDROCHLORIDE 0.4 MG/ML
INJECTION, SOLUTION INTRAMUSCULAR; INTRAVENOUS; SUBCUTANEOUS PRN
Status: DISCONTINUED | OUTPATIENT
Start: 2024-05-18 | End: 2024-05-18 | Stop reason: HOSPADM

## 2024-05-18 RX ORDER — SODIUM CHLORIDE, SODIUM LACTATE, POTASSIUM CHLORIDE, CALCIUM CHLORIDE 600; 310; 30; 20 MG/100ML; MG/100ML; MG/100ML; MG/100ML
INJECTION, SOLUTION INTRAVENOUS CONTINUOUS PRN
Status: DISCONTINUED | OUTPATIENT
Start: 2024-05-18 | End: 2024-05-18 | Stop reason: SDUPTHER

## 2024-05-18 RX ORDER — LIDOCAINE HYDROCHLORIDE 20 MG/ML
INJECTION, SOLUTION EPIDURAL; INFILTRATION; INTRACAUDAL; PERINEURAL PRN
Status: DISCONTINUED | OUTPATIENT
Start: 2024-05-18 | End: 2024-05-18 | Stop reason: SDUPTHER

## 2024-05-18 RX ORDER — GLYCOPYRROLATE 0.2 MG/ML
INJECTION, SOLUTION INTRAMUSCULAR; INTRAVENOUS PRN
Status: DISCONTINUED | OUTPATIENT
Start: 2024-05-18 | End: 2024-05-18 | Stop reason: SDUPTHER

## 2024-05-18 RX ORDER — SODIUM CHLORIDE 0.9 % (FLUSH) 0.9 %
5-40 SYRINGE (ML) INJECTION EVERY 12 HOURS SCHEDULED
Status: CANCELLED | OUTPATIENT
Start: 2024-05-18

## 2024-05-18 RX ORDER — NEOSTIGMINE METHYLSULFATE 1 MG/ML
INJECTION, SOLUTION INTRAVENOUS PRN
Status: DISCONTINUED | OUTPATIENT
Start: 2024-05-18 | End: 2024-05-18 | Stop reason: SDUPTHER

## 2024-05-18 RX ORDER — ROCURONIUM BROMIDE 10 MG/ML
INJECTION, SOLUTION INTRAVENOUS PRN
Status: DISCONTINUED | OUTPATIENT
Start: 2024-05-18 | End: 2024-05-18 | Stop reason: SDUPTHER

## 2024-05-18 RX ORDER — SODIUM CHLORIDE 0.9 % (FLUSH) 0.9 %
5-40 SYRINGE (ML) INJECTION PRN
Status: CANCELLED | OUTPATIENT
Start: 2024-05-18

## 2024-05-18 RX ORDER — ONDANSETRON 4 MG/1
4 TABLET, ORALLY DISINTEGRATING ORAL EVERY 8 HOURS PRN
Status: CANCELLED | OUTPATIENT
Start: 2024-05-18

## 2024-05-18 RX ORDER — MIDAZOLAM HYDROCHLORIDE 1 MG/ML
INJECTION INTRAMUSCULAR; INTRAVENOUS PRN
Status: DISCONTINUED | OUTPATIENT
Start: 2024-05-18 | End: 2024-05-18 | Stop reason: SDUPTHER

## 2024-05-18 RX ORDER — SODIUM CHLORIDE 9 MG/ML
INJECTION, SOLUTION INTRAVENOUS CONTINUOUS
Status: DISPENSED | OUTPATIENT
Start: 2024-05-18 | End: 2024-05-19

## 2024-05-18 RX ORDER — SODIUM CHLORIDE 9 MG/ML
INJECTION, SOLUTION INTRAVENOUS PRN
Status: DISCONTINUED | OUTPATIENT
Start: 2024-05-18 | End: 2024-05-18 | Stop reason: HOSPADM

## 2024-05-18 RX ORDER — SENNA AND DOCUSATE SODIUM 50; 8.6 MG/1; MG/1
1 TABLET, FILM COATED ORAL 2 TIMES DAILY
Status: CANCELLED | OUTPATIENT
Start: 2024-05-18

## 2024-05-18 RX ADMIN — PROPOFOL 100 MG: 10 INJECTION, EMULSION INTRAVENOUS at 13:12

## 2024-05-18 RX ADMIN — ROCURONIUM BROMIDE 40 MG: 10 SOLUTION INTRAVENOUS at 13:17

## 2024-05-18 RX ADMIN — SODIUM CHLORIDE, PRESERVATIVE FREE 10 ML: 5 INJECTION INTRAVENOUS at 08:20

## 2024-05-18 RX ADMIN — ROCURONIUM BROMIDE 10 MG: 10 SOLUTION INTRAVENOUS at 13:11

## 2024-05-18 RX ADMIN — DEXAMETHASONE SODIUM PHOSPHATE 8 MG: 4 INJECTION, SOLUTION INTRAMUSCULAR; INTRAVENOUS at 13:38

## 2024-05-18 RX ADMIN — LIDOCAINE HYDROCHLORIDE 80 MG: 20 INJECTION, SOLUTION EPIDURAL; INFILTRATION; INTRACAUDAL; PERINEURAL at 13:12

## 2024-05-18 RX ADMIN — SODIUM CHLORIDE: 9 INJECTION, SOLUTION INTRAVENOUS at 16:09

## 2024-05-18 RX ADMIN — Medication 80 MCG: at 16:09

## 2024-05-18 RX ADMIN — GLYCOPYRROLATE 0.4 MG: 0.2 INJECTION, SOLUTION INTRAMUSCULAR; INTRAVENOUS at 15:57

## 2024-05-18 RX ADMIN — MIDAZOLAM 2 MG: 1 INJECTION INTRAMUSCULAR; INTRAVENOUS at 13:06

## 2024-05-18 RX ADMIN — MIDAZOLAM 2 MG: 1 INJECTION INTRAMUSCULAR; INTRAVENOUS at 13:00

## 2024-05-18 RX ADMIN — Medication 80 MCG: at 13:18

## 2024-05-18 RX ADMIN — ACETAMINOPHEN 650 MG: 325 TABLET ORAL at 23:18

## 2024-05-18 RX ADMIN — Medication 100 MG: at 08:19

## 2024-05-18 RX ADMIN — FENTANYL CITRATE 25 MCG: 50 INJECTION, SOLUTION INTRAMUSCULAR; INTRAVENOUS at 13:44

## 2024-05-18 RX ADMIN — MIDAZOLAM 1 MG: 1 INJECTION INTRAMUSCULAR; INTRAVENOUS at 13:09

## 2024-05-18 RX ADMIN — HYDROMORPHONE HYDROCHLORIDE 1 MG: 1 INJECTION, SOLUTION INTRAMUSCULAR; INTRAVENOUS; SUBCUTANEOUS at 15:50

## 2024-05-18 RX ADMIN — OXYCODONE 10 MG: 5 TABLET ORAL at 17:50

## 2024-05-18 RX ADMIN — FENTANYL CITRATE 25 MCG: 50 INJECTION, SOLUTION INTRAMUSCULAR; INTRAVENOUS at 13:48

## 2024-05-18 RX ADMIN — ACETAMINOPHEN 650 MG: 325 TABLET ORAL at 06:49

## 2024-05-18 RX ADMIN — Medication 120 MG: at 13:12

## 2024-05-18 RX ADMIN — ROCURONIUM BROMIDE 20 MG: 10 SOLUTION INTRAVENOUS at 13:58

## 2024-05-18 RX ADMIN — ONDANSETRON 4 MG: 2 INJECTION INTRAMUSCULAR; INTRAVENOUS at 16:09

## 2024-05-18 RX ADMIN — ACETAMINOPHEN 650 MG: 325 TABLET ORAL at 17:49

## 2024-05-18 RX ADMIN — Medication 120 MCG: at 14:05

## 2024-05-18 RX ADMIN — PHENYLEPHRINE HYDROCHLORIDE 30 MCG/MIN: 10 INJECTION INTRAVENOUS at 14:12

## 2024-05-18 RX ADMIN — OXYCODONE 10 MG: 5 TABLET ORAL at 22:10

## 2024-05-18 RX ADMIN — THERA TABS 1 TABLET: TAB at 08:19

## 2024-05-18 RX ADMIN — NEOSTIGMINE METHYLSULFATE 3 MG: 1 INJECTION, SOLUTION INTRAVENOUS at 15:57

## 2024-05-18 RX ADMIN — SODIUM CHLORIDE, POTASSIUM CHLORIDE, SODIUM LACTATE AND CALCIUM CHLORIDE: 600; 310; 30; 20 INJECTION, SOLUTION INTRAVENOUS at 13:00

## 2024-05-18 RX ADMIN — Medication 80 MCG: at 14:11

## 2024-05-18 RX ADMIN — Medication 1 MG: at 08:19

## 2024-05-18 RX ADMIN — SODIUM CHLORIDE, PRESERVATIVE FREE 10 ML: 5 INJECTION INTRAVENOUS at 22:11

## 2024-05-18 RX ADMIN — SODIUM CHLORIDE: 9 INJECTION, SOLUTION INTRAVENOUS at 16:43

## 2024-05-18 RX ADMIN — WATER 2000 MG: 1 INJECTION INTRAMUSCULAR; INTRAVENOUS; SUBCUTANEOUS at 13:24

## 2024-05-18 RX ADMIN — FENTANYL CITRATE 50 MCG: 50 INJECTION, SOLUTION INTRAMUSCULAR; INTRAVENOUS at 14:35

## 2024-05-18 RX ADMIN — SODIUM CHLORIDE: 9 INJECTION, SOLUTION INTRAVENOUS at 22:04

## 2024-05-18 ASSESSMENT — PAIN DESCRIPTION - ORIENTATION
ORIENTATION: LEFT

## 2024-05-18 ASSESSMENT — PAIN - FUNCTIONAL ASSESSMENT: PAIN_FUNCTIONAL_ASSESSMENT: NONE - DENIES PAIN

## 2024-05-18 ASSESSMENT — PAIN SCALES - GENERAL
PAINLEVEL_OUTOF10: 4
PAINLEVEL_OUTOF10: 5
PAINLEVEL_OUTOF10: 5
PAINLEVEL_OUTOF10: 8
PAINLEVEL_OUTOF10: 4
PAINLEVEL_OUTOF10: 8
PAINLEVEL_OUTOF10: 5

## 2024-05-18 ASSESSMENT — PAIN DESCRIPTION - LOCATION
LOCATION: KNEE;HIP
LOCATION: HIP

## 2024-05-18 ASSESSMENT — PAIN DESCRIPTION - DESCRIPTORS: DESCRIPTORS: ACHING

## 2024-05-18 ASSESSMENT — PAIN SCALES - WONG BAKER: WONGBAKER_NUMERICALRESPONSE: HURTS LITTLE MORE

## 2024-05-18 NOTE — PERIOP NOTE
TRANSFER - OUT REPORT:    Verbal report given to Gena SHORE(name) on Roland Bennett  being transferred to 442(unit) for routine post-op       Report consisted of patient’s Situation, Background, Assessment and   Recommendations(SBAR).     Time Pre op antibiotic given:1324  Anesthesia Stop time: 1624    Information from the following report(s) SBAR, Procedure Summary, Intake/Output, and MAR was reviewed with the receiving nurse.    Opportunity for questions and clarification was provided.     Is the patient on 02? Yes       L/Min 2        Is the patient on a monitor? Yes    Is the nurse transporting with the patient? Yes    Surgical Waiting Area notified of patient's transfer from PACU? Yes    Patient's bracelet sent up to room with him on chart

## 2024-05-18 NOTE — OP NOTE
Operative Report    Patient Name: Roland Bennett    Patient YOB: 1960    Patient's [facility] MRN: [id number]    Date of Procedure: 05/18/24    Surgical Location: [facility]    Preoperative diagnosis: Left closed displaced femoral neck fracture, retained full-length intramedullary hardware    Postoperative diagnosis: Left closed displaced femoral neck fracture, retained full-length intramedullary hardware    Procedures: Left femur removal of deep hardware through 4 separate incisions, left hip hemiarthroplasty by posterior approach    Surgeon: Flakito Merrill MD    Assistants: surgical staff    Anesthesia: General    Preoperative IV Antibiotics: Ancef 2g    Findings: Retrograde femoral stanislav able to be removed.  Displaced fracture which extended quite low to the lesser trochanter. Implants placed, stable and well fixed    Estimated Blood Loss: 450mL    Implants: Depuy Femoral head Bipolar size 53 + 8 mm, Actis Femoral component Standard Offset size 11     Specimens: None    Complications: None    Disposition: hemodynamically stable to PACU    Condition: stable      Indication for Procedure:  Roland Bennett suffered a left displaced femoral neck fracture.  We discussed the treatment options, as well as the risks, benefits, complications, convalescence regarding left hip hemiarthroplasty via a posterior approach.  I explained that having intramedullary stanislav made things quite more complex and with its positioning, the stanislav would have to be removed. the patient agreed to proceed forth with surgery.     Procedure in Detail:  The patient was met in the preoperative holding area.  The appropriate surgical site was marked and a consent form was signed.  All questions are answered.  The patient was brought into the operating room and given general anesthesia. The patient was then moved onto the radiolucent operating room table and placed in the lateral decubitus position with a peg board for pelvic

## 2024-05-18 NOTE — PLAN OF CARE
Problem: Discharge Planning  Goal: Discharge to home or other facility with appropriate resources  5/18/2024 1002 by Ciara Meyer RN  Outcome: Progressing  5/18/2024 0031 by Cristel Chairez RN  Outcome: Progressing  Flowsheets (Taken 5/17/2024 2030)  Discharge to home or other facility with appropriate resources: Identify barriers to discharge with patient and caregiver     Problem: Pain  Goal: Verbalizes/displays adequate comfort level or baseline comfort level  5/18/2024 1002 by Ciara Meyer RN  Outcome: Progressing  5/18/2024 0031 by Cristel Chairez RN  Outcome: Progressing     Problem: Skin/Tissue Integrity  Goal: Absence of new skin breakdown  Description: 1.  Monitor for areas of redness and/or skin breakdown  2.  Assess vascular access sites hourly  3.  Every 4-6 hours minimum:  Change oxygen saturation probe site  4.  Every 4-6 hours:  If on nasal continuous positive airway pressure, respiratory therapy assess nares and determine need for appliance change or resting period.  5/18/2024 1002 by Ciara Meyer RN  Outcome: Progressing  5/18/2024 0031 by Cristel Chairez RN  Outcome: Progressing     Problem: ABCDS Injury Assessment  Goal: Absence of physical injury  5/18/2024 1002 by Ciara Meyer RN  Outcome: Progressing  5/18/2024 0031 by Cristel Chairez RN  Outcome: Progressing     Problem: Safety - Adult  Goal: Free from fall injury  5/18/2024 1002 by Ciara Meyer RN  Outcome: Progressing  5/18/2024 0031 by Cristel Chairez RN  Outcome: Progressing

## 2024-05-18 NOTE — PROGRESS NOTES
[facility]                                                                                          Hospitalist Progress Note  Yesica Romero PA-C  Answering service: [phone] OR [phone] from in house phone        Date of Service:  2024  NAME:  Roland Bennett  :  1960  MRN:  [id number]       Admission Summary:   Roland Bennett is a 64 y.o. male with a past medical history of alcohol dependence, anemia, dysphagia, malnutrition, and throat cancer who presented after ground-level fall at [facility].  Patient had been drinking alcohol, tripped and fell in the parking lot at [facility], and injured his left hip. Upon arrival to the ED, he was found to have a left hip fracture. He states that he does not drink daily, but did have 2 drinks consisting of a 40 ounce of beer, and a 24 ounce beers.     In the ED, vital signs stable. Labs show ethanol 240, hemoglobin 11, and sodium 126.    Interval history / Subjective:   Upon seeing the patient on rounds, he was resting comfortably sitting up in bed. Patient states his pain is mild and he is \"ready to have his surgery.\" Patient denies CP, SOB, abd pain, fevers and chills.     Assessment & Plan:     Left Femoral Neck Fracture  -Status post ground-level fall while intoxicated with alcohol  -Ortho following: plan to proceed to OR with Dr. Merrill on  at 1200 for Left Hip Hemiarthroplasty  -NPO  -Bedrest  -Pain control: Acetaminophen, Oxycodone, Morphine for breakthrough; Lido patch, Ice  -Neurovascular checks q4hrs  -At present, there are no medical concerns that would keep patient from being amenable to surgery.     ETOH Dependence  -Alcohol level upon arrival in   -CIWA with as needed Ativan  -Thiamine plus folate plus MVI     Hyponatremia  -Na: 126  -Patricia, Uosm, and serum osm WNL  -Do not increase more than 6meq in 24 hours  -D5NS with 20 meq K+  -Q6h BMP     Throat cancer  Dysphagia  -Status post

## 2024-05-18 NOTE — PLAN OF CARE
Problem: Discharge Planning  Goal: Discharge to home or other facility with appropriate resources  Outcome: Progressing  Flowsheets (Taken 5/17/2024 2030)  Discharge to home or other facility with appropriate resources: Identify barriers to discharge with patient and caregiver     Problem: Pain  Goal: Verbalizes/displays adequate comfort level or baseline comfort level  Outcome: Progressing     Problem: Skin/Tissue Integrity  Goal: Absence of new skin breakdown  Description: 1.  Monitor for areas of redness and/or skin breakdown  2.  Assess vascular access sites hourly  3.  Every 4-6 hours minimum:  Change oxygen saturation probe site  4.  Every 4-6 hours:  If on nasal continuous positive airway pressure, respiratory therapy assess nares and determine need for appliance change or resting period.  Outcome: Progressing     Problem: ABCDS Injury Assessment  Goal: Absence of physical injury  Outcome: Progressing     Problem: Safety - Adult  Goal: Free from fall injury  Outcome: Progressing

## 2024-05-18 NOTE — ANESTHESIA PRE PROCEDURE
5736       Allergies:  No Known Allergies    Problem List:    Patient Active Problem List   Diagnosis Code    Closed left hip fracture, initial encounter ([facility]) S72.002A       Past Medical History:  No past medical history on file.    Past Surgical History:  No past surgical history on file.    Social History:    Social History     Tobacco Use    Smoking status: Never    Smokeless tobacco: Never   Substance Use Topics    Alcohol use: Yes                                Counseling given: Not Answered      Vital Signs (Current):   Vitals:    05/18/24 0400 05/18/24 0600 05/18/24 0645 05/18/24 0736   BP:    138/84   Pulse: 71 87  94   Resp:    18   Temp:    98.1 °F (36.7 °C)   TempSrc:    Oral   SpO2:    99%   Weight:   77.9 kg (171 lb 11.8 oz)    Height:                                                  BP Readings from Last 3 Encounters:   05/18/24 138/84   09/19/23 135/87       NPO Status:                                                                                 BMI:   Wt Readings from Last 3 Encounters:   05/18/24 77.9 kg (171 lb 11.8 oz)   09/19/23 71.2 kg (157 lb)     Body mass index is 22.66 kg/m².    CBC:   Lab Results   Component Value Date/Time    WBC 5.7 05/18/2024 02:58 AM    RBC 3.76 05/18/2024 02:58 AM    HGB 11.7 05/18/2024 02:58 AM    HCT 34.1 05/18/2024 02:58 AM    MCV 90.7 05/18/2024 02:58 AM    RDW 14.6 05/18/2024 02:58 AM     05/18/2024 02:58 AM       CMP:   Lab Results   Component Value Date/Time     05/18/2024 02:58 AM    K 4.4 05/18/2024 02:58 AM    CL 98 05/18/2024 02:58 AM    CO2 23 05/18/2024 02:58 AM    BUN 8 05/18/2024 02:58 AM    CREATININE 0.85 05/18/2024 02:58 AM    LABGLOM >90 05/18/2024 02:58 AM    GLUCOSE 117 05/18/2024 02:58 AM    CALCIUM 8.2 05/18/2024 02:58 AM    BILITOT 0.4 05/17/2024 01:17 AM    ALKPHOS 95 05/17/2024 01:17 AM    AST 18 05/17/2024 01:17 AM    ALT 14 05/17/2024 01:17 AM       POC Tests: No results for input(s): \"POCGLU\", \"POCNA\", \"POCK\", \"POCCL\",

## 2024-05-18 NOTE — ANESTHESIA POSTPROCEDURE EVALUATION
Department of Anesthesiology  Postprocedure Note    Patient: Roland Bennett  MRN: [id number]  YOB: 1960  Date of evaluation: 5/18/2024    Procedure Summary       Date: 05/18/24 Room / Location: [facility] MAIN OR [address] [place] [address] MAIN OR    Anesthesia Start: 1300 Anesthesia Stop: 1624    Procedure: LEFT HIP HEMIARTHROPLASTY (POSTERIOR).LEFT FEMUR HARDWARE REMOVAL (Left: Hip) Diagnosis:       Closed fracture of left hip, initial encounter ([facility])      (Closed fracture of left hip, initial encounter ([facility]) [S72.002A])    Providers: Flakito Merrill MD Responsible Provider: Romeo Babin MD    Anesthesia Type: General ASA Status: 3 - Emergent            Anesthesia Type: General    Mable Phase I: Mable Score: 9    Mable Phase II:      Anesthesia Post Evaluation    Patient location during evaluation: PACU  Patient participation: complete - patient participated  Level of consciousness: awake  Airway patency: patent  Nausea & Vomiting: no nausea  Cardiovascular status: blood pressure returned to baseline and hemodynamically stable  Respiratory status: acceptable  Hydration status: stable  Comments: Likely corneal abrasion in right eye. Discussed it should improve over 24 hours. Eye patch offered for comfort.  Multimodal analgesia pain management approach    No notable events documented.

## 2024-05-18 NOTE — PROGRESS NOTES
Progress Note    Assessment:   Roland Bennett is a 64 y.o. year-old male with left femoral neck Fracture planning surgery    Plan:   -Weighbearing: NWB LLE  -DVT prophylaxis: SCDs, frequent ambulation, hold for surgery  -Antibiotics: Ancef on-call to the OR  -Pain control: Continue as needed pain management, ice to operative area, elevate  -PT/OT for mobilization  -Dispo:  -I rediscussed the plan for surgery with him.  He agrees to proceed forth with removal of the nail and left hip hemiarthroplasty  -Consent form signed  -Surgical site marked  -NPO  -Hold chemical anticoagulation  -To the OR this afternoon      Subjective:   No acute events over night. Roland Bennett states that he is doing ok.    Denies CP, SOB, nausea/vomitting, parasthesias.     Objective:     Vitals:    05/18/24 1242   BP: 119/85   Pulse: 92   Resp: 14   Temp: 98.9 °F (37.2 °C)   SpO2:        Patient Vitals for the past 24 hrs:   Temp Pulse Resp BP SpO2   05/18/24 1242 98.9 °F (37.2 °C) 92 14 119/85 --   05/18/24 1102 98.9 °F (37.2 °C) (!) 107 14 115/77 100 %   05/18/24 1000 -- (!) 105 -- -- --   05/18/24 0736 98.1 °F (36.7 °C) 94 18 138/84 99 %   05/18/24 0600 -- 87 -- -- --   05/18/24 0400 -- 71 -- -- --   05/18/24 0336 98 °F (36.7 °C) (!) 101 14 (!) 182/97 98 %   05/18/24 0200 -- 91 -- -- --   05/17/24 2330 99.1 °F (37.3 °C) -- -- -- --   05/17/24 2242 -- -- 16 -- --   05/17/24 2212 -- -- 14 -- --   05/17/24 2200 -- 93 -- -- --   05/17/24 2000 -- 95 -- -- --   05/17/24 1920 99.8 °F (37.7 °C) 88 15 125/69 94 %   05/17/24 1800 -- 69 -- -- --   05/17/24 1503 98.7 °F (37.1 °C) 87 14 (!) 148/90 93 %   05/17/24 1400 -- 78 -- -- --        Physical Exam:  Gen: NAD, AAOx3  Resp: No acute respiratory distress  MSK:  LLE:  Shortened, externally rotated  Unable to SLR  Pain with passive hip motion  TTP over GT/ASIS  Motor intact EHL/FHL/DF/PF  Sensation intact spn/dpn/tib  Foot warm, well perfused         Intake/Output Summary (Last 24 hours) at 5/18/2024  MG/DL    Creatinine 0.76 0.70 - 1.30 MG/DL    BUN/Creatinine Ratio 9 (L) 12 - 20      Est, Glom Filt Rate >90 >60 ml/min/1.73m2    Calcium 8.3 (L) 8.5 - 10.1 MG/DL          Flakito Merrill MD    05/18/24  12:55 PM        Flakito Merrill M.D.  [facility]   [place] Office  Cell: [phone]  Office: [phone]  Medical Staff: Sandee Case MA

## 2024-05-19 LAB
ANION GAP SERPL CALC-SCNC: 5 MMOL/L (ref 5–15)
ANION GAP SERPL CALC-SCNC: 5 MMOL/L (ref 5–15)
BASOPHILS # BLD: 0 K/UL (ref 0–0.1)
BASOPHILS NFR BLD: 0 % (ref 0–1)
BUN SERPL-MCNC: 11 MG/DL (ref 6–20)
BUN SERPL-MCNC: 11 MG/DL (ref 6–20)
BUN/CREAT SERPL: 13 (ref 12–20)
BUN/CREAT SERPL: 13 (ref 12–20)
CALCIUM SERPL-MCNC: 7.9 MG/DL (ref 8.5–10.1)
CALCIUM SERPL-MCNC: 8 MG/DL (ref 8.5–10.1)
CHLORIDE SERPL-SCNC: 102 MMOL/L (ref 97–108)
CHLORIDE SERPL-SCNC: 103 MMOL/L (ref 97–108)
CO2 SERPL-SCNC: 23 MMOL/L (ref 21–32)
CO2 SERPL-SCNC: 24 MMOL/L (ref 21–32)
CREAT SERPL-MCNC: 0.83 MG/DL (ref 0.7–1.3)
CREAT SERPL-MCNC: 0.88 MG/DL (ref 0.7–1.3)
DIFFERENTIAL METHOD BLD: ABNORMAL
EOSINOPHIL # BLD: 0 K/UL (ref 0–0.4)
EOSINOPHIL NFR BLD: 0 % (ref 0–7)
ERYTHROCYTE [DISTWIDTH] IN BLOOD BY AUTOMATED COUNT: 14.8 % (ref 11.5–14.5)
GLUCOSE SERPL-MCNC: 117 MG/DL (ref 65–100)
GLUCOSE SERPL-MCNC: 133 MG/DL (ref 65–100)
HCT VFR BLD AUTO: 25.4 % (ref 36.6–50.3)
HCT VFR BLD AUTO: 29.9 % (ref 36.6–50.3)
HGB BLD-MCNC: 10.1 G/DL (ref 12.1–17)
HGB BLD-MCNC: 8.5 G/DL (ref 12.1–17)
IMM GRANULOCYTES # BLD AUTO: 0 K/UL (ref 0–0.04)
IMM GRANULOCYTES NFR BLD AUTO: 0 % (ref 0–0.5)
LYMPHOCYTES # BLD: 0.6 K/UL (ref 0.8–3.5)
LYMPHOCYTES NFR BLD: 6 % (ref 12–49)
MCH RBC QN AUTO: 31.2 PG (ref 26–34)
MCHC RBC AUTO-ENTMCNC: 33.8 G/DL (ref 30–36.5)
MCV RBC AUTO: 92.3 FL (ref 80–99)
MONOCYTES # BLD: 0.8 K/UL (ref 0–1)
MONOCYTES NFR BLD: 9 % (ref 5–13)
NEUTS SEG # BLD: 7.9 K/UL (ref 1.8–8)
NEUTS SEG NFR BLD: 85 % (ref 32–75)
NRBC # BLD: 0 K/UL (ref 0–0.01)
NRBC BLD-RTO: 0 PER 100 WBC
PLATELET # BLD AUTO: 141 K/UL (ref 150–400)
PMV BLD AUTO: 10 FL (ref 8.9–12.9)
POTASSIUM SERPL-SCNC: 4.6 MMOL/L (ref 3.5–5.1)
POTASSIUM SERPL-SCNC: 4.7 MMOL/L (ref 3.5–5.1)
RBC # BLD AUTO: 3.24 M/UL (ref 4.1–5.7)
RBC MORPH BLD: ABNORMAL
SODIUM SERPL-SCNC: 131 MMOL/L (ref 136–145)
SODIUM SERPL-SCNC: 131 MMOL/L (ref 136–145)
WBC # BLD AUTO: 9.3 K/UL (ref 4.1–11.1)

## 2024-05-19 PROCEDURE — 36415 COLL VENOUS BLD VENIPUNCTURE: CPT

## 2024-05-19 PROCEDURE — 97535 SELF CARE MNGMENT TRAINING: CPT

## 2024-05-19 PROCEDURE — 6370000000 HC RX 637 (ALT 250 FOR IP): Performed by: FAMILY MEDICINE

## 2024-05-19 PROCEDURE — 6370000000 HC RX 637 (ALT 250 FOR IP): Performed by: ORTHOPAEDIC SURGERY

## 2024-05-19 PROCEDURE — 97530 THERAPEUTIC ACTIVITIES: CPT

## 2024-05-19 PROCEDURE — 30233N1 TRANSFUSION OF NONAUTOLOGOUS RED BLOOD CELLS INTO PERIPHERAL VEIN, PERCUTANEOUS APPROACH: ICD-10-PCS | Performed by: HOSPITALIST

## 2024-05-19 PROCEDURE — 80048 BASIC METABOLIC PNL TOTAL CA: CPT

## 2024-05-19 PROCEDURE — 97165 OT EVAL LOW COMPLEX 30 MIN: CPT

## 2024-05-19 PROCEDURE — 99024 POSTOP FOLLOW-UP VISIT: CPT | Performed by: PHYSICIAN ASSISTANT

## 2024-05-19 PROCEDURE — 85018 HEMOGLOBIN: CPT

## 2024-05-19 PROCEDURE — 6370000000 HC RX 637 (ALT 250 FOR IP): Performed by: PHYSICIAN ASSISTANT

## 2024-05-19 PROCEDURE — 2580000003 HC RX 258: Performed by: ORTHOPAEDIC SURGERY

## 2024-05-19 PROCEDURE — 85014 HEMATOCRIT: CPT

## 2024-05-19 PROCEDURE — 2060000000 HC ICU INTERMEDIATE R&B

## 2024-05-19 PROCEDURE — 97161 PT EVAL LOW COMPLEX 20 MIN: CPT

## 2024-05-19 PROCEDURE — 2580000003 HC RX 258: Performed by: FAMILY MEDICINE

## 2024-05-19 PROCEDURE — 85025 COMPLETE CBC W/AUTO DIFF WBC: CPT

## 2024-05-19 RX ADMIN — SODIUM CHLORIDE, PRESERVATIVE FREE 10 ML: 5 INJECTION INTRAVENOUS at 21:46

## 2024-05-19 RX ADMIN — ACETAMINOPHEN 650 MG: 325 TABLET ORAL at 11:48

## 2024-05-19 RX ADMIN — ACETAMINOPHEN 650 MG: 325 TABLET ORAL at 06:22

## 2024-05-19 RX ADMIN — OXYCODONE 5 MG: 5 TABLET ORAL at 11:56

## 2024-05-19 RX ADMIN — SODIUM CHLORIDE: 9 INJECTION, SOLUTION INTRAVENOUS at 06:15

## 2024-05-19 RX ADMIN — ACETAMINOPHEN 650 MG: 325 TABLET ORAL at 23:58

## 2024-05-19 RX ADMIN — Medication 100 MG: at 08:03

## 2024-05-19 RX ADMIN — ACETAMINOPHEN 650 MG: 325 TABLET ORAL at 18:00

## 2024-05-19 RX ADMIN — OXYCODONE 10 MG: 5 TABLET ORAL at 06:59

## 2024-05-19 RX ADMIN — SODIUM CHLORIDE: 9 INJECTION, SOLUTION INTRAVENOUS at 13:58

## 2024-05-19 RX ADMIN — Medication 1 MG: at 08:03

## 2024-05-19 RX ADMIN — SODIUM CHLORIDE, PRESERVATIVE FREE 10 ML: 5 INJECTION INTRAVENOUS at 08:04

## 2024-05-19 RX ADMIN — THERA TABS 1 TABLET: TAB at 08:03

## 2024-05-19 ASSESSMENT — PAIN SCALES - GENERAL
PAINLEVEL_OUTOF10: 8
PAINLEVEL_OUTOF10: 6
PAINLEVEL_OUTOF10: 5
PAINLEVEL_OUTOF10: 4
PAINLEVEL_OUTOF10: 5
PAINLEVEL_OUTOF10: 8

## 2024-05-19 ASSESSMENT — PAIN DESCRIPTION - LOCATION
LOCATION: HIP
LOCATION: LEG

## 2024-05-19 ASSESSMENT — PAIN DESCRIPTION - ORIENTATION
ORIENTATION: LEFT

## 2024-05-19 ASSESSMENT — PAIN DESCRIPTION - DESCRIPTORS
DESCRIPTORS: ACHING
DESCRIPTORS: ACHING

## 2024-05-19 NOTE — PLAN OF CARE
Problem: Occupational Therapy - Adult  Goal: By Discharge: Performs self-care activities at highest level of function for planned discharge setting.  See evaluation for individualized goals.  Description: FUNCTIONAL STATUS PRIOR TO ADMISSION:  Patient resided in [facility] for 2 years, able to complete dressing and bathing without assistance.    Receives Help From: Other (comment) (staff at the facility),  ,  ,  ,  ,  ,  ,  , Ambulation Assistance: Needs assistance, Transfer Assistance: Needs assistance (walker), Active : No         HOME SUPPORT: Patient lived in [facility].     Occupational Therapy Goals  Initiated 5/19/2024    1. Patient will perform lower body dressing with AE PRN with Minimal Assist within 7 day(s).  2. Patient will perform upper body ADLS standing for 5 minutes without fatigue or LOB with Contact Guard Assist within 7 days.  3. Patient will perform toilet transfers with Contact Guard Assist using Bedside Commode Over Toilet within 7 days.  4. Patient will perform all aspects of toileting at Contact Guard Assist within 7 days.  5. Patient will utilize energy conservation techniques during functional activities without cues within 7 day(s).      Outcome: Progressing   OCCUPATIONAL THERAPY EVALUATION    Patient: Roland Bennett (64 y.o. male)  Date: 5/19/2024  Primary Diagnosis: Closed left hip fracture, initial encounter ([facility]) [S72.002A]  Procedure(s) (LRB):  LEFT HIP HEMIARTHROPLASTY (POSTERIOR).LEFT FEMUR HARDWARE REMOVAL (Left) 1 Day Post-Op     Precautions: Fall Risk (WBAT L)                    ASSESSMENT :  The patient is limited by decreased functional mobility, independence in ADLs, high-level IADLs, ROM, strength, sensation, body mechanics, activity tolerance, safety awareness, coordination, balance, posture, increased pain levels.    Patient received reclined in bed, amenable to session. Completed bed mobility to EOB with x1 assist, however very anxious when beginning to move LLE

## 2024-05-19 NOTE — PROGRESS NOTES
Ortho Daily Progress Note      Patient: Roland Bennett                   MRN: [id number]  Sex: male  YOB: 1960           Age: 64 y.o.    1 Day Post-Op    Procedure(s):  LEFT HIP HEMIARTHROPLASTY (POSTERIOR).LEFT FEMUR HARDWARE REMOVAL    Subjective: Left hip/thigh pain as expected after surgery yesterday. Concerned about bleeding on his knee dressing.      Vitals:    05/19/24 1145   BP: (!) 101/46   Pulse: (!) 104   Resp: 15   Temp: 98.6 °F (37 °C)   SpO2: 97%        Lab Results:  Hemoglobin   Date/Time Value Ref Range Status   05/19/2024 12:40 AM 10.1 (L) 12.1 - 17.0 g/dL Final     INR   Date/Time Value Ref Range Status   05/17/2024 01:17 AM 1.0 0.9 - 1.1   Final     Comment:     A single therapeutic range for Vit K antagonists may not be optimal for all indications - see June, 2008 issue of Chest, American College of Chest Physicians Evidence-Based Clinical Practice Guidelines, 8th Edition.       Physical Exam:    GENERAL: 65yo male, anxious about bleeding on dressing, alert/oriented, NAD  DRESSING:  All left leg dressings remain intact and sealed. There is some dried blood on the anterior knee dressing and the most posterior hip dressing.Does not appear to have any active bleeding  SWELLING: mild swelling in left hip, knee, and thigh as expected   NEUROLOGICAL: moving foot/toes normally, sensation intact to light touch throughout left leg  VASCULAR: 2+ DP pulse   DVT Exam: no evidence of DVT seen on physical exam.      Plan:    Posterior hip precautions  Leave post-op dressings in place x 1 week. May change to sterile island dressing if dressing becomes saturated or integrity of dressing is disrupted  DVT prophylaxis: Lovenox 40mg sub q daily x 4 weeks  Weight bearing restrictionWBAT  Pain Control: Tylenol, Oxycodone, Ice  Dispo: Plan to return to [facility]   Follow-up with Dr. Merrill in 2 weeks    LORENA Chou  5/19/2024   12:20 PM

## 2024-05-19 NOTE — PLAN OF CARE
Problem: Physical Therapy - Adult  Goal: By Discharge: Performs mobility at highest level of function for planned discharge setting.  See evaluation for individualized goals.  Description: FUNCTIONAL STATUS PRIOR TO ADMISSION: Patient was independent and active without use of DME. Reports no falls lately.     HOME SUPPORT PRIOR TO ADMISSION: Resides at [facility].     Physical Therapy Goals  Initiated 5/19/2024  1.  Patient will move from supine to sit and sit to supine, scoot up and down, and roll side to side in bed with contact guard assist within 4 day(s).    2.  Patient will perform sit to stand with contact guard assist within 4 day(s).  3.  Patient will transfer from bed to chair and chair to bed with contact guard assist using the least restrictive device within 4 day(s).  4.  Patient will ambulate with contact guard assist for 50 feet with the least restrictive device within 4 day(s).   5.  Patient will perform posterior hip home exercise program per protocol with supervision/set-up within 4 days.  6. Patient will verbalize and demonstrate understanding of avoiding sudden and extreme movement within 4 days.   7. Patient will demonstrate understanding/maintain weight bearing as tolerated status during functional mobility within 4 days.      Outcome: Progressing   PHYSICAL THERAPY EVALUATION    Patient: Roland Bennett (64 y.o. male)  Date: 5/19/2024  Primary Diagnosis: Closed left hip fracture, initial encounter ([facility]) [S72.002A]  Procedure(s) (LRB):  LEFT HIP HEMIARTHROPLASTY (POSTERIOR).LEFT FEMUR HARDWARE REMOVAL (Left) 1 Day Post-Op   Precautions: Restrictions/Precautions: Fall Risk (WBAT L)                      ASSESSMENT :   DEFICITS/IMPAIRMENTS:   The patient is limited by decreased functional mobility, ROM, strength, coordination, balance, posture, increased pain levels following admission for L hip fracture, now POD 1 L hip cecilia. Patient received supine in bed, agreeable to therapy. Able to    Balance  Sitting: Impaired  Sitting - Static: Good (unsupported)  Sitting - Dynamic: Fair (occasional)  Standing: Impaired  Standing - Static: Fair  Standing - Dynamic: Fair      Activity Tolerance:   Fair  and requires frequent rest breaks    After treatment:   Patient left in no apparent distress sitting up in chair, Call bell within reach, and Bed/ chair alarm activated    COMMUNICATION/EDUCATION:   The patient's plan of care was discussed with: occupational therapist and registered nurse         Thank you for this referral.  Jade Baker, PT, DPT  Minutes: 19      Physical Therapy Evaluation Charge Determination   History Examination Presentation Decision-Making   HIGH Complexity :3+ comorbidities / personal factors will impact the outcome/ POC  HIGH Complexity : 4+ Standardized tests and measures addressing body structure, function, activity limitation and / or participation in recreation  LOW Complexity : Stable, uncomplicated  AM-PAC  MEDIUM   Based on the above components, the patient evaluation is determined to be of the following complexity level: Low

## 2024-05-19 NOTE — PLAN OF CARE
Problem: Discharge Planning  Goal: Discharge to home or other facility with appropriate resources  Outcome: Progressing  Flowsheets (Taken 5/18/2024 2009 by Cristel Chairez RN)  Discharge to home or other facility with appropriate resources: Identify barriers to discharge with patient and caregiver     Problem: Pain  Goal: Verbalizes/displays adequate comfort level or baseline comfort level  5/19/2024 0815 by Ciara Meyer RN  Outcome: Progressing  5/19/2024 0045 by Cristel Chairez RN  Outcome: Progressing     Problem: Skin/Tissue Integrity  Goal: Absence of new skin breakdown  Description: 1.  Monitor for areas of redness and/or skin breakdown  2.  Assess vascular access sites hourly  3.  Every 4-6 hours minimum:  Change oxygen saturation probe site  4.  Every 4-6 hours:  If on nasal continuous positive airway pressure, respiratory therapy assess nares and determine need for appliance change or resting period.  5/19/2024 0815 by Ciara Meyer RN  Outcome: Progressing  5/19/2024 0045 by Cristel Chairez RN  Outcome: Progressing     Problem: ABCDS Injury Assessment  Goal: Absence of physical injury  5/19/2024 0815 by Ciara Meyer, RN  Outcome: Progressing  5/19/2024 0045 by Cristel Chairez RN  Outcome: Progressing     Problem: Safety - Adult  Goal: Free from fall injury  5/19/2024 0815 by Ciara Meyer RN  Outcome: Progressing  5/19/2024 0045 by Cristel Chairez RN  Outcome: Progressing

## 2024-05-19 NOTE — PROGRESS NOTES
Physical Therapy  5/19/2024    Order received, chart reviewed. Attempted x2 to see patient this AM for PT. Upon first attempt, he was eating breakfast and when PT returned, he politely stated he preferred for orthopedic team to see his L knee dressing (evidence of old drainage/blood) prior to mobilizing with therapy. Gathered his PLOF history and room set up for OOB and ambulation as tolerated. Will follow up after ortho sees him. Thank you.    Jade Baker, PT, DPT

## 2024-05-19 NOTE — PROGRESS NOTES
Hx of trach      Code status: FUll  Prophylaxis: SCDs  Care Plan discussed with: Patient, Attending  Anticipated Disposition: [place], 24 - 48 hours           Review of Systems:   Pertinent items are noted in HPI.       Vital Signs:    Last 24hrs VS reviewed since prior progress note. Most recent are:  Vitals:    05/19/24 0801   BP: (!) 109/59   Pulse: (!) 112   Resp:    Temp: 98.7 °F (37.1 °C)   SpO2:          Intake/Output Summary (Last 24 hours) at 5/19/2024 0832  Last data filed at 5/19/2024 0720  Gross per 24 hour   Intake 1000 ml   Output 1150 ml   Net -150 ml        Physical Examination:     I had a face to face encounter with this patient and independently examined them on 5/19/2024 as outlined below:          Constitutional:  No acute distress, cooperative, pleasant    ENT:  Oral mucosa moist, oropharynx benign.    Resp:  CTA bilaterally. No wheezing/rhonchi/rales. No accessory muscle use.    CV:  Regular rhythm, normal rate, no murmurs, gallops, rubs    GI:  Soft, non distended, non tender. normoactive bowel sounds    Musculoskeletal:  L Hip dressing C/D/I, L Knee dressing with some blood saturation, however C/D/I    Neurologic:  Moves all extremities.  AAOx3, CN II-XII grossly intact            Data Review:    Review and/or order of clinical lab test  Review and/or order of tests in the radiology section of CPT  Review and/or order of tests in the medicine section of CPT      Labs:     Recent Labs     05/18/24  0258 05/19/24  0040   WBC 5.7 9.3   HGB 11.7* 10.1*   HCT 34.1* 29.9*   * 141*     Recent Labs     05/18/24  0833 05/19/24  0040 05/19/24  0731   * 131* 131*   K 4.4 4.7 4.6    102 103   CO2 25 24 23   BUN 7 11 11     Recent Labs     05/17/24  0117   ALT 14   GLOB 5.1*     Recent Labs     05/17/24  0117 05/17/24  0611   INR 1.0  --    APTT  --  29.3      No results for input(s): \"TIBC\" in the last 72 hours.    Invalid input(s): \"FE\", \"PSAT\", \"FERR\"   No results found for:  \"RBCF\"   No results for input(s): \"PH\", \"PCO2\", \"PO2\" in the last 72 hours.  No results for input(s): \"CPK\" in the last 72 hours.    Invalid input(s): \"CPKMB\", \"CKNDX\", \"TROIQ\"  No results found for: \"CHOL\", \"CHLST\", \"CHOLV\", \"HDL\", \"HDLC\", \"LDL\"  No results found for: \"GLUCPOC\"  [unfilled]      Medications Reviewed:     Current Facility-Administered Medications   Medication Dose Route Frequency    0.9 % sodium chloride infusion   IntraVENous Continuous    sodium chloride flush 0.9 % injection 5-40 mL  5-40 mL IntraVENous 2 times per day    sodium chloride flush 0.9 % injection 5-40 mL  5-40 mL IntraVENous PRN    0.9 % sodium chloride infusion   IntraVENous PRN    potassium chloride (KLOR-CON) extended release tablet 40 mEq  40 mEq Oral PRN    Or    potassium bicarb-citric acid (EFFER-K) effervescent tablet 40 mEq  40 mEq Oral PRN    Or    potassium chloride 10 mEq/100 mL IVPB (Peripheral Line)  10 mEq IntraVENous PRN    magnesium sulfate 2000 mg in 50 mL IVPB premix  2,000 mg IntraVENous PRN    ondansetron (ZOFRAN-ODT) disintegrating tablet 4 mg  4 mg Oral Q8H PRN    Or    ondansetron (ZOFRAN) injection 4 mg  4 mg IntraVENous Q6H PRN    polyethylene glycol (GLYCOLAX) packet 17 g  17 g Oral Daily PRN    acetaminophen (TYLENOL) tablet 650 mg  650 mg Oral Q6H PRN    Or    acetaminophen (TYLENOL) suppository 650 mg  650 mg Rectal Q6H PRN    morphine (PF) injection 4 mg  4 mg IntraVENous Q4H PRN    naloxone (NARCAN) injection 0.4 mg  0.4 mg IntraVENous PRN    sodium chloride flush 0.9 % injection 5-40 mL  5-40 mL IntraVENous 2 times per day    sodium chloride flush 0.9 % injection 5-40 mL  5-40 mL IntraVENous PRN    0.9 % sodium chloride infusion   IntraVENous PRN    thiamine tablet 100 mg  100 mg Oral Daily    LORazepam (ATIVAN) tablet 1 mg  1 mg Oral Q1H PRN    Or    LORazepam (ATIVAN) injection 1 mg  1 mg IntraVENous Q1H PRN    Or    LORazepam (ATIVAN) tablet 2 mg  2 mg Oral Q1H PRN    Or    LORazepam (ATIVAN) injection

## 2024-05-20 ENCOUNTER — APPOINTMENT (OUTPATIENT)
Facility: HOSPITAL | Age: 64
End: 2024-05-20
Payer: MEDICAID

## 2024-05-20 LAB
ALBUMIN SERPL-MCNC: 2.1 G/DL (ref 3.5–5)
ALBUMIN SERPL-MCNC: 2.1 G/DL (ref 3.5–5)
ALBUMIN/GLOB SERPL: 0.5 (ref 1.1–2.2)
ALBUMIN/GLOB SERPL: 0.5 (ref 1.1–2.2)
ALP SERPL-CCNC: 132 U/L (ref 45–117)
ALP SERPL-CCNC: 77 U/L (ref 45–117)
ALT SERPL-CCNC: 14 U/L (ref 12–78)
ALT SERPL-CCNC: 30 U/L (ref 12–78)
ANION GAP SERPL CALC-SCNC: 4 MMOL/L (ref 5–15)
ANION GAP SERPL CALC-SCNC: 5 MMOL/L (ref 5–15)
ANION GAP SERPL CALC-SCNC: 5 MMOL/L (ref 5–15)
AST SERPL-CCNC: 28 U/L (ref 15–37)
AST SERPL-CCNC: 56 U/L (ref 15–37)
BASOPHILS # BLD: 0 K/UL (ref 0–0.1)
BASOPHILS # BLD: 0.1 K/UL (ref 0–0.1)
BASOPHILS NFR BLD: 0 % (ref 0–1)
BASOPHILS NFR BLD: 1 % (ref 0–1)
BILIRUB SERPL-MCNC: 0.5 MG/DL (ref 0.2–1)
BILIRUB SERPL-MCNC: 0.7 MG/DL (ref 0.2–1)
BUN SERPL-MCNC: 10 MG/DL (ref 6–20)
BUN SERPL-MCNC: 7 MG/DL (ref 6–20)
BUN SERPL-MCNC: 9 MG/DL (ref 6–20)
BUN/CREAT SERPL: 11 (ref 12–20)
BUN/CREAT SERPL: 11 (ref 12–20)
BUN/CREAT SERPL: 9 (ref 12–20)
CALCIUM SERPL-MCNC: 8.1 MG/DL (ref 8.5–10.1)
CALCIUM SERPL-MCNC: 8.2 MG/DL (ref 8.5–10.1)
CALCIUM SERPL-MCNC: 8.4 MG/DL (ref 8.5–10.1)
CHLORIDE SERPL-SCNC: 100 MMOL/L (ref 97–108)
CHLORIDE SERPL-SCNC: 100 MMOL/L (ref 97–108)
CHLORIDE SERPL-SCNC: 104 MMOL/L (ref 97–108)
CO2 SERPL-SCNC: 24 MMOL/L (ref 21–32)
CO2 SERPL-SCNC: 24 MMOL/L (ref 21–32)
CO2 SERPL-SCNC: 25 MMOL/L (ref 21–32)
COMMENT:: NORMAL
CREAT SERPL-MCNC: 0.81 MG/DL (ref 0.7–1.3)
CREAT SERPL-MCNC: 0.82 MG/DL (ref 0.7–1.3)
CREAT SERPL-MCNC: 0.87 MG/DL (ref 0.7–1.3)
D DIMER PPP FEU-MCNC: 3.95 MG/L FEU (ref 0–0.65)
DIFFERENTIAL METHOD BLD: ABNORMAL
DIFFERENTIAL METHOD BLD: ABNORMAL
EKG ATRIAL RATE: 157 BPM
EKG DIAGNOSIS: NORMAL
EKG P AXIS: 58 DEGREES
EKG P-R INTERVAL: 134 MS
EKG Q-T INTERVAL: 304 MS
EKG QRS DURATION: 80 MS
EKG QTC CALCULATION (BAZETT): 491 MS
EKG R AXIS: 59 DEGREES
EKG T AXIS: 231 DEGREES
EKG VENTRICULAR RATE: 157 BPM
EOSINOPHIL # BLD: 0.2 K/UL (ref 0–0.4)
EOSINOPHIL # BLD: 0.3 K/UL (ref 0–0.4)
EOSINOPHIL NFR BLD: 2 % (ref 0–7)
EOSINOPHIL NFR BLD: 3 % (ref 0–7)
ERYTHROCYTE [DISTWIDTH] IN BLOOD BY AUTOMATED COUNT: 14.6 % (ref 11.5–14.5)
ERYTHROCYTE [DISTWIDTH] IN BLOOD BY AUTOMATED COUNT: 14.7 % (ref 11.5–14.5)
GLOBULIN SER CALC-MCNC: 4.5 G/DL (ref 2–4)
GLOBULIN SER CALC-MCNC: 4.5 G/DL (ref 2–4)
GLUCOSE SERPL-MCNC: 122 MG/DL (ref 65–100)
GLUCOSE SERPL-MCNC: 124 MG/DL (ref 65–100)
GLUCOSE SERPL-MCNC: 126 MG/DL (ref 65–100)
HCT VFR BLD AUTO: 23.1 % (ref 36.6–50.3)
HCT VFR BLD AUTO: 25.2 % (ref 36.6–50.3)
HCT VFR BLD AUTO: 25.2 % (ref 36.6–50.3)
HGB BLD-MCNC: 7.7 G/DL (ref 12.1–17)
HGB BLD-MCNC: 8.4 G/DL (ref 12.1–17)
HGB BLD-MCNC: 8.8 G/DL (ref 12.1–17)
HISTORY CHECK: NORMAL
IMM GRANULOCYTES # BLD AUTO: 0 K/UL (ref 0–0.04)
IMM GRANULOCYTES # BLD AUTO: 0.1 K/UL (ref 0–0.04)
IMM GRANULOCYTES NFR BLD AUTO: 0 % (ref 0–0.5)
IMM GRANULOCYTES NFR BLD AUTO: 1 % (ref 0–0.5)
LACTATE SERPL-SCNC: 0.9 MMOL/L (ref 0.4–2)
LYMPHOCYTES # BLD: 0.8 K/UL (ref 0.8–3.5)
LYMPHOCYTES # BLD: 0.9 K/UL (ref 0.8–3.5)
LYMPHOCYTES NFR BLD: 10 % (ref 12–49)
LYMPHOCYTES NFR BLD: 9 % (ref 12–49)
MAGNESIUM SERPL-MCNC: 1.7 MG/DL (ref 1.6–2.4)
MAGNESIUM SERPL-MCNC: 1.8 MG/DL (ref 1.6–2.4)
MCH RBC QN AUTO: 30.8 PG (ref 26–34)
MCH RBC QN AUTO: 30.9 PG (ref 26–34)
MCHC RBC AUTO-ENTMCNC: 33.3 G/DL (ref 30–36.5)
MCHC RBC AUTO-ENTMCNC: 33.3 G/DL (ref 30–36.5)
MCV RBC AUTO: 92.3 FL (ref 80–99)
MCV RBC AUTO: 92.8 FL (ref 80–99)
MONOCYTES # BLD: 1.2 K/UL (ref 0–1)
MONOCYTES # BLD: 1.2 K/UL (ref 0–1)
MONOCYTES NFR BLD: 13 % (ref 5–13)
MONOCYTES NFR BLD: 13 % (ref 5–13)
NEUTS SEG # BLD: 6.7 K/UL (ref 1.8–8)
NEUTS SEG # BLD: 6.9 K/UL (ref 1.8–8)
NEUTS SEG NFR BLD: 73 % (ref 32–75)
NEUTS SEG NFR BLD: 76 % (ref 32–75)
NRBC # BLD: 0 K/UL (ref 0–0.01)
NRBC # BLD: 0 K/UL (ref 0–0.01)
NRBC BLD-RTO: 0 PER 100 WBC
NRBC BLD-RTO: 0 PER 100 WBC
NT PRO BNP: 62 PG/ML
PHOSPHATE SERPL-MCNC: 2 MG/DL (ref 2.6–4.7)
PHOSPHATE SERPL-MCNC: 2 MG/DL (ref 2.6–4.7)
PLATELET # BLD AUTO: 122 K/UL (ref 150–400)
PLATELET # BLD AUTO: 125 K/UL (ref 150–400)
PMV BLD AUTO: 10.4 FL (ref 8.9–12.9)
PMV BLD AUTO: 9.4 FL (ref 8.9–12.9)
POTASSIUM SERPL-SCNC: 4 MMOL/L (ref 3.5–5.1)
POTASSIUM SERPL-SCNC: 4.1 MMOL/L (ref 3.5–5.1)
POTASSIUM SERPL-SCNC: 4.1 MMOL/L (ref 3.5–5.1)
PROT SERPL-MCNC: 6.6 G/DL (ref 6.4–8.2)
PROT SERPL-MCNC: 6.6 G/DL (ref 6.4–8.2)
RBC # BLD AUTO: 2.49 M/UL (ref 4.1–5.7)
RBC # BLD AUTO: 2.73 M/UL (ref 4.1–5.7)
RBC MORPH BLD: ABNORMAL
SODIUM SERPL-SCNC: 129 MMOL/L (ref 136–145)
SODIUM SERPL-SCNC: 129 MMOL/L (ref 136–145)
SODIUM SERPL-SCNC: 133 MMOL/L (ref 136–145)
SPECIMEN HOLD: NORMAL
TROPONIN I SERPL HS-MCNC: 6 NG/L (ref 0–76)
WBC # BLD AUTO: 9.1 K/UL (ref 4.1–11.1)
WBC # BLD AUTO: 9.2 K/UL (ref 4.1–11.1)

## 2024-05-20 PROCEDURE — 97530 THERAPEUTIC ACTIVITIES: CPT

## 2024-05-20 PROCEDURE — 6370000000 HC RX 637 (ALT 250 FOR IP): Performed by: PHYSICIAN ASSISTANT

## 2024-05-20 PROCEDURE — 86921 COMPATIBILITY TEST INCUBATE: CPT

## 2024-05-20 PROCEDURE — 93010 ELECTROCARDIOGRAM REPORT: CPT | Performed by: INTERNAL MEDICINE

## 2024-05-20 PROCEDURE — 86902 BLOOD TYPE ANTIGEN DONOR EA: CPT

## 2024-05-20 PROCEDURE — 86870 RBC ANTIBODY IDENTIFICATION: CPT

## 2024-05-20 PROCEDURE — 83880 ASSAY OF NATRIURETIC PEPTIDE: CPT

## 2024-05-20 PROCEDURE — 2700000000 HC OXYGEN THERAPY PER DAY

## 2024-05-20 PROCEDURE — 6360000004 HC RX CONTRAST MEDICATION: Performed by: RADIOLOGY

## 2024-05-20 PROCEDURE — 85014 HEMATOCRIT: CPT

## 2024-05-20 PROCEDURE — 92610 EVALUATE SWALLOWING FUNCTION: CPT

## 2024-05-20 PROCEDURE — 97535 SELF CARE MNGMENT TRAINING: CPT

## 2024-05-20 PROCEDURE — 86922 COMPATIBILITY TEST ANTIGLOB: CPT

## 2024-05-20 PROCEDURE — 2580000003 HC RX 258: Performed by: PHYSICIAN ASSISTANT

## 2024-05-20 PROCEDURE — 36430 TRANSFUSION BLD/BLD COMPNT: CPT

## 2024-05-20 PROCEDURE — 84100 ASSAY OF PHOSPHORUS: CPT

## 2024-05-20 PROCEDURE — 86920 COMPATIBILITY TEST SPIN: CPT

## 2024-05-20 PROCEDURE — 36415 COLL VENOUS BLD VENIPUNCTURE: CPT

## 2024-05-20 PROCEDURE — 71275 CT ANGIOGRAPHY CHEST: CPT

## 2024-05-20 PROCEDURE — 6370000000 HC RX 637 (ALT 250 FOR IP): Performed by: FAMILY MEDICINE

## 2024-05-20 PROCEDURE — P9047 ALBUMIN (HUMAN), 25%, 50ML: HCPCS

## 2024-05-20 PROCEDURE — 99024 POSTOP FOLLOW-UP VISIT: CPT | Performed by: PHYSICIAN ASSISTANT

## 2024-05-20 PROCEDURE — 83735 ASSAY OF MAGNESIUM: CPT

## 2024-05-20 PROCEDURE — 85018 HEMOGLOBIN: CPT

## 2024-05-20 PROCEDURE — 86900 BLOOD TYPING SEROLOGIC ABO: CPT

## 2024-05-20 PROCEDURE — 2060000000 HC ICU INTERMEDIATE R&B

## 2024-05-20 PROCEDURE — 86901 BLOOD TYPING SEROLOGIC RH(D): CPT

## 2024-05-20 PROCEDURE — 85379 FIBRIN DEGRADATION QUANT: CPT

## 2024-05-20 PROCEDURE — 86880 COOMBS TEST DIRECT: CPT

## 2024-05-20 PROCEDURE — 2580000003 HC RX 258: Performed by: FAMILY MEDICINE

## 2024-05-20 PROCEDURE — 86850 RBC ANTIBODY SCREEN: CPT

## 2024-05-20 PROCEDURE — 85025 COMPLETE CBC W/AUTO DIFF WBC: CPT

## 2024-05-20 PROCEDURE — 80053 COMPREHEN METABOLIC PANEL: CPT

## 2024-05-20 PROCEDURE — 84484 ASSAY OF TROPONIN QUANT: CPT

## 2024-05-20 PROCEDURE — 94760 N-INVAS EAR/PLS OXIMETRY 1: CPT

## 2024-05-20 PROCEDURE — 83605 ASSAY OF LACTIC ACID: CPT

## 2024-05-20 PROCEDURE — 93005 ELECTROCARDIOGRAM TRACING: CPT

## 2024-05-20 PROCEDURE — P9016 RBC LEUKOCYTES REDUCED: HCPCS

## 2024-05-20 PROCEDURE — 6370000000 HC RX 637 (ALT 250 FOR IP): Performed by: ORTHOPAEDIC SURGERY

## 2024-05-20 PROCEDURE — 6360000002 HC RX W HCPCS

## 2024-05-20 RX ORDER — ALBUMIN (HUMAN) 12.5 G/50ML
25 SOLUTION INTRAVENOUS ONCE
Status: COMPLETED | OUTPATIENT
Start: 2024-05-20 | End: 2024-05-21

## 2024-05-20 RX ORDER — SODIUM CHLORIDE 9 MG/ML
INJECTION, SOLUTION INTRAVENOUS CONTINUOUS
Status: DISCONTINUED | OUTPATIENT
Start: 2024-05-20 | End: 2024-05-22

## 2024-05-20 RX ORDER — SODIUM CHLORIDE, SODIUM LACTATE, POTASSIUM CHLORIDE, AND CALCIUM CHLORIDE .6; .31; .03; .02 G/100ML; G/100ML; G/100ML; G/100ML
1000 INJECTION, SOLUTION INTRAVENOUS ONCE
Status: DISCONTINUED | OUTPATIENT
Start: 2024-05-20 | End: 2024-05-24 | Stop reason: HOSPADM

## 2024-05-20 RX ORDER — ASPIRIN 81 MG/1
81 TABLET, CHEWABLE ORAL 2 TIMES DAILY
Status: DISCONTINUED | OUTPATIENT
Start: 2024-05-20 | End: 2024-05-24 | Stop reason: HOSPADM

## 2024-05-20 RX ORDER — SODIUM CHLORIDE 9 MG/ML
INJECTION, SOLUTION INTRAVENOUS PRN
Status: DISCONTINUED | OUTPATIENT
Start: 2024-05-20 | End: 2024-05-24 | Stop reason: HOSPADM

## 2024-05-20 RX ADMIN — ACETAMINOPHEN 650 MG: 325 TABLET ORAL at 23:17

## 2024-05-20 RX ADMIN — THERA TABS 1 TABLET: TAB at 08:35

## 2024-05-20 RX ADMIN — SODIUM CHLORIDE, PRESERVATIVE FREE 10 ML: 5 INJECTION INTRAVENOUS at 20:40

## 2024-05-20 RX ADMIN — SODIUM CHLORIDE: 9 INJECTION, SOLUTION INTRAVENOUS at 11:57

## 2024-05-20 RX ADMIN — OXYCODONE 10 MG: 5 TABLET ORAL at 20:38

## 2024-05-20 RX ADMIN — ALBUMIN (HUMAN) 25 G: 0.25 INJECTION, SOLUTION INTRAVENOUS at 23:12

## 2024-05-20 RX ADMIN — Medication 1 MG: at 08:35

## 2024-05-20 RX ADMIN — IOPAMIDOL 80 ML: 755 INJECTION, SOLUTION INTRAVENOUS at 06:55

## 2024-05-20 RX ADMIN — SODIUM CHLORIDE, PRESERVATIVE FREE 10 ML: 5 INJECTION INTRAVENOUS at 08:36

## 2024-05-20 RX ADMIN — ASPIRIN 81 MG CHEWABLE TABLET 81 MG: 81 TABLET CHEWABLE at 12:02

## 2024-05-20 RX ADMIN — ACETAMINOPHEN 650 MG: 325 TABLET ORAL at 11:44

## 2024-05-20 RX ADMIN — ACETAMINOPHEN 650 MG: 325 TABLET ORAL at 08:35

## 2024-05-20 RX ADMIN — OXYCODONE 10 MG: 5 TABLET ORAL at 11:44

## 2024-05-20 RX ADMIN — SODIUM CHLORIDE: 9 INJECTION, SOLUTION INTRAVENOUS at 20:08

## 2024-05-20 RX ADMIN — Medication 100 MG: at 08:35

## 2024-05-20 RX ADMIN — ASPIRIN 81 MG CHEWABLE TABLET 81 MG: 81 TABLET CHEWABLE at 20:38

## 2024-05-20 RX ADMIN — ACETAMINOPHEN 650 MG: 325 TABLET ORAL at 18:08

## 2024-05-20 ASSESSMENT — PAIN DESCRIPTION - LOCATION
LOCATION: HIP;KNEE
LOCATION: LEG
LOCATION: LEG
LOCATION: HIP;KNEE
LOCATION: LEG

## 2024-05-20 ASSESSMENT — PAIN DESCRIPTION - DESCRIPTORS
DESCRIPTORS: ACHING

## 2024-05-20 ASSESSMENT — PAIN SCALES - GENERAL
PAINLEVEL_OUTOF10: 6
PAINLEVEL_OUTOF10: 5
PAINLEVEL_OUTOF10: 7
PAINLEVEL_OUTOF10: 10
PAINLEVEL_OUTOF10: 4
PAINLEVEL_OUTOF10: 3

## 2024-05-20 ASSESSMENT — PAIN DESCRIPTION - ORIENTATION
ORIENTATION: LEFT

## 2024-05-20 ASSESSMENT — PAIN SCALES - WONG BAKER
WONGBAKER_NUMERICALRESPONSE: HURTS A LITTLE BIT
WONGBAKER_NUMERICALRESPONSE: HURTS A LITTLE BIT

## 2024-05-20 NOTE — PLAN OF CARE
0610 Rapid response called overhead due to tachycardia sustaining 150-160s; previously 80-110s. BP stable, O2 stable. Pt asymptomatic, no discomfort. Rapid RN, Glendy and covering NP, Latasha at bedside.    0645 Completed EKG, lab work, CT     0700 Blood transfusion ordered     0730 Blood bank called to confirm transfusion due to Hgb 8.5; said they have to get approval from pathologist before preparing    HR below 100     0735 Report given to day shift SILVIANO Urbina        Problem: Discharge Planning  Goal: Discharge to home or other facility with appropriate resources  Outcome: Progressing  Flowsheets (Taken 5/19/2024 2100)  Discharge to home or other facility with appropriate resources: Identify barriers to discharge with patient and caregiver     Problem: Pain  Goal: Verbalizes/displays adequate comfort level or baseline comfort level  Outcome: Progressing     Problem: Skin/Tissue Integrity  Goal: Absence of new skin breakdown  Description: 1.  Monitor for areas of redness and/or skin breakdown  2.  Assess vascular access sites hourly  3.  Every 4-6 hours minimum:  Change oxygen saturation probe site  4.  Every 4-6 hours:  If on nasal continuous positive airway pressure, respiratory therapy assess nares and determine need for appliance change or resting period.  Outcome: Progressing     Problem: ABCDS Injury Assessment  Goal: Absence of physical injury  Outcome: Progressing  Flowsheets (Taken 5/19/2024 2100)  Absence of Physical Injury: Implement safety measures based on patient assessment     Problem: Safety - Adult  Goal: Free from fall injury  Outcome: Progressing  Flowsheets (Taken 5/19/2024 2100)  Free From Fall Injury: Instruct family/caregiver on patient safety

## 2024-05-20 NOTE — PROGRESS NOTES
Progress Note    Status Post: Left femur removal of intramedullary nail followed by left hip hemiarthroplasty by Posterior approach  Date of Surgery: 5/18/24  Surgeon: Dr. Bar Merrill    Assessment:   Roland Bennett is a 64 y.o. year-old male with Left hip fracture status post removal of hardware and hemiarthroplasty by Posterior Approach POD #1    Plan:   -Weighbearing: WBAT LLE, device as needed  -Hip precautions: Posterior  -DVT prophylaxis: SCDs, frequent ambulation, Lovenox; consider switching to ASA 81mg twice daily at discharge  -Antibiotics: for 24h  -Pain control: Continue as needed pain management, ice to operative area  -PT/OT for mobilization and gait training  -Incentive Spirometry  -Dispo: Discharge planning to be determined when ready  Follow up with me in office in 10-14 days      Subjective:     No acute events over night. Roland Bennett states that he is doing ok.  He does have considerable pain.  He was able to stand although it was too painful to walk.    Denies CP, SOB, nausea/vomitting, parasthesias.     Objective:     Vitals:    05/19/24 2000   BP:    Pulse: 99   Resp:    Temp:    SpO2:        Patient Vitals for the past 24 hrs:   Temp Pulse Resp BP SpO2   05/19/24 2000 -- 99 -- -- --   05/19/24 1907 99 °F (37.2 °C) 96 19 120/61 98 %   05/19/24 1800 -- (!) 102 -- -- --   05/19/24 1520 98.5 °F (36.9 °C) 99 16 123/66 98 %   05/19/24 1413 -- -- -- (!) 101/51 --   05/19/24 1400 -- (!) 110 -- (!) 88/51 --   05/19/24 1200 -- 96 -- -- --   05/19/24 1145 98.6 °F (37 °C) (!) 104 15 (!) 101/46 97 %   05/19/24 1000 -- (!) 101 -- -- --   05/19/24 0801 98.7 °F (37.1 °C) (!) 112 -- (!) 109/59 98 %   05/19/24 0700 100 °F (37.8 °C) -- -- -- --   05/19/24 0659 -- -- 18 -- --   05/19/24 0559 -- 62 -- -- --   05/19/24 0500 99.1 °F (37.3 °C) -- -- -- --   05/19/24 0454 99.1 °F (37.3 °C) -- -- -- --   05/19/24 0355 -- 92 -- -- --   05/19/24 0150 -- (!) 106 -- -- --   05/18/24 2244 98.1 °F (36.7 °C) -- -- 107/64 --

## 2024-05-20 NOTE — PROGRESS NOTES
[facility]                                                                                          Hospitalist Progress Note  David M Milligram, PA-C  Answering service: [phone] OR [phone] from in house phone        Date of Service:  2024  NAME:  Roland Bennett  :  1960  MRN:  [id number]      Admission Summary:   Roland Bennett is a 64 y.o. male with a past medical history of alcohol dependence, anemia, dysphagia, malnutrition, and throat cancer who presented after ground-level fall at [place].  Patient had been drinking alcohol, tripped and fell in the parking lot at [place], and injured his left hip. Upon arrival to the ED, he was found to have a left hip fracture. He states that he does not drink daily, but did have 2 drinks consisting of a 40 ounce of beer, and a 24 ounce beers.     In the ED, vital signs stable. Labs show ethanol 240, hemoglobin 11, and sodium 126    Interval history / Subjective:   Overnight events noted, discussed with night shift NP and RRT. Patient developed sinus tachycardia to 150s, labs and CTA ordered. At bedside the patient reports feeling his heart racing, but denies, chest pain, shortness of breath, headache, lightheadedness, weakness, numbness or tingling. Plan as documented below     Assessment & Plan:     Left Femoral Neck Fracture  - Status post ground-level fall while intoxicated with alcohol  - Ortho following: S/P OR with Dr. Merrill on    - Pain control: Acetaminophen, Oxycodone, Morphine for breakthrough; Lido patch, Ice  - Neurovascular checks q4hrs  - PT/OT to see today  - PRN pain control  - Follow H&H post-operatively    Sinus tachycardia  - Overnight patient was found to be in sinus tachycardia with a rate in the 150s, night team ordered CTA chest, and labs  - Suspect post-op blood loss vs PE, less likely EtOH withdrawal given absence of symptoms. Patient reports palpitations but otherwise denies any  This is time spent at this critically ill patient's bedside actively involved in patient care as well as the coordination of care and discussions with the patient's family.  This does not include any procedural time which has been billed separately.       Code status: FUll  Prophylaxis: SCDs  Care Plan discussed with: Patient, Attending  Anticipated Disposition: [place], 24 - 48 hours           Review of Systems:   Pertinent items are noted in HPI.       Vital Signs:    Last 24hrs VS reviewed since prior progress note. Most recent are:  Vitals:    05/20/24 0600   BP:    Pulse: (!) 158   Resp:    Temp: 99.2 °F (37.3 °C)   SpO2:          Intake/Output Summary (Last 24 hours) at 5/20/2024 0720  Last data filed at 5/19/2024 1801  Gross per 24 hour   Intake --   Output 500 ml   Net -500 ml          Physical Examination:     I had a face to face encounter with this patient and independently examined them on 5/20/2024 as outlined below:          Constitutional:  No acute distress, cooperative, pleasant    ENT:  Oral mucosa moist, oropharynx benign.    Resp:  CTA bilaterally. No wheezing/rhonchi/rales. No accessory muscle use.    CV:  Sinus tachycardia, improved to low 100s, no murmurs, gallops, rubs    GI:  Soft, non distended, non tender. normoactive bowel sounds    Musculoskeletal:  L Hip dressing C/D/I, L Knee dressing with some blood saturation, however C/D/I    Neurologic:  Moves all extremities.  AAOx3, CN II-XII grossly intact            Data Review:    Review and/or order of clinical lab test  Review and/or order of tests in the radiology section of CPT  Review and/or order of tests in the medicine section of CPT      Labs:     Recent Labs     05/19/24  0040 05/19/24  1228 05/20/24  0006   WBC 9.3  --  9.1   HGB 10.1* 8.5* 8.4*   HCT 29.9* 25.4* 25.2*   *  --  122*       Recent Labs     05/19/24  0731 05/20/24  0006 05/20/24  0627   * 129* 129*   K 4.6 4.1 4.0    100 100   CO2 23 24 24   BUN

## 2024-05-20 NOTE — PLAN OF CARE
Problem: Occupational Therapy - Adult  Goal: By Discharge: Performs self-care activities at highest level of function for planned discharge setting.  See evaluation for individualized goals.  Description: FUNCTIONAL STATUS PRIOR TO ADMISSION:  Patient resided in [facility] for 2 years, able to complete dressing and bathing without assistance.    Receives Help From: Other (comment) (staff at the facility),  ,  ,  ,  ,  ,  ,  , Ambulation Assistance: Needs assistance, Transfer Assistance: Needs assistance (walker), Active : No         HOME SUPPORT: Patient lived in [facility].     Occupational Therapy Goals  Initiated 5/19/2024    1. Patient will perform lower body dressing with AE PRN with Minimal Assist within 7 day(s).  2. Patient will perform upper body ADLS standing for 5 minutes without fatigue or LOB with Contact Guard Assist within 7 days.  3. Patient will perform toilet transfers with Contact Guard Assist using Bedside Commode Over Toilet within 7 days.  4. Patient will perform all aspects of toileting at Contact Guard Assist within 7 days.  5. Patient will utilize energy conservation techniques during functional activities without cues within 7 day(s).      Outcome: Progressing   OCCUPATIONAL THERAPY TREATMENT  Patient: Roland Bennett (64 y.o. male)  Date: 5/20/2024  Primary Diagnosis: Closed left hip fracture, initial encounter ([facility]) [S72.002A]  Procedure(s) (LRB):  LEFT HIP HEMIARTHROPLASTY (POSTERIOR).LEFT FEMUR HARDWARE REMOVAL (Left) 2 Days Post-Op   Precautions: Fall Risk (WBAT L)              Posterior hip precautions  Chart, occupational therapy assessment, plan of care, and goals were reviewed.    ASSESSMENT  Patient continues to benefit from skilled OT services and is slowly progressing towards goals. Pt presented in bed agreeable to therapy. Pt remains limited by LLE pain, LLE limited AROM/PROM, and overall completing BADLs below baseline of independent. Pt deferring trialing any BADL  activities at this time. Provided hip kit and educated on each piece of equipment. Pt reporting he is familiar with equipment from previous sx but now only has the reacher at home. Pt left seated in recliner with BLE elevated and all needs met.       PLAN :  Patient continues to benefit from skilled intervention to address the above impairments.  Continue treatment per established plan of care to address goals.    Recommend with staff: OOB to chair 3x/day via RW, toileting using BSC    Recommend next OT session: Pt demonstrate use of LB AE    Recommendation for discharge: (in order for the patient to meet his/her long term goals): Therapy 3 hours/day 5-7 days/week or therapy at his LTC    Other factors to consider for discharge: no additional factors    IF patient discharges home will need the following DME: none       SUBJECTIVE:   Patient stated “I know this stuff cause I had my hip done.”    OBJECTIVE DATA SUMMARY:   Cognitive/Behavioral Status:     Cognition  Overall Cognitive Status: Exceptions  Arousal/Alertness: Appears intact  Following Commands: Follows one step commands with repetition  Attention Span: Attends with cues to redirect  Memory: Appears intact  Safety Judgement: Impaired  Insights: Fully aware of deficits  Initiation: Requires cues for some  Sequencing: Requires cues for some    Functional Mobility and Transfers for ADLs:  Bed Mobility:  Bed Mobility Training  Bed Mobility Training: Yes  Supine to Sit: Minimum assistance;Assist X2  Scooting: Minimum assistance;Assist X1     Transfers:   Transfer Training  Transfer Training: Yes  Sit to Stand: Contact-guard assistance;Assist X1  Stand to Sit: Contact-guard assistance;Assist X1  Stand Pivot Transfers: Contact-guard assistance;Minimum assistance;Assist X2           Balance:     Balance  Sitting: Intact  Sitting - Static: Good (unsupported)  Sitting - Dynamic: Good (unsupported)  Standing: Impaired  Standing - Static: Constant

## 2024-05-20 NOTE — PROGRESS NOTES
[facility] Hospitalist cross coverage (7p-7a) progress note:    Rapid Response called for tachycardia, , patient denies CP or SOB, states he feels at baseline. HR previously  then suddenly jumped to 150s warranting rapid response. RN noted oxygen dropping while sleeping and applied 2L NC. Patient does not appear to be in distress. BP 99/75, oxygen 92-98% on 2L NC, temp 99.2. Patient is post op day 2, not on anticoagulation. L hip and L knee do not appear to have new blood on dressings.   CTA chest and stat cbc to r/o PE and acute blood loss anemia. 1L LR infusing.   Type and screen, trop, bnp, lactic, mag, phos, cmp pending.    Latasha ARNETTEZIO

## 2024-05-20 NOTE — PROGRESS NOTES
1057: Nurse messaged PA due to patient having trouble eating his meals. Patient has a hx of throat CA and has a mass on R neck. Patient stated that at [facility] he was able to eat soft meals but has noticed that it has been harder for him to eat meals here. PA notified and orders placed.

## 2024-05-20 NOTE — PROGRESS NOTES
ORTHO PROGRESS NOTE    May 20, 2024  Admit Date:   2024    Post Op day: 2 Days Post-Op    Subjective:    Roland Bennett states that overall he feels relatively well.  Some pain in the left hip and left knee.  Had rapid response called overnight due to tachycardia.  Now receiving 1 unit PRBC secondary to symptomatic anemia.  No new orthopedic complaints.  Tolerating diet  Denies N/V/SOB or CP    PT/OT:   Gait:                    Vital Signs:    Patient Vitals for the past 8 hrs:   BP Temp Temp src Pulse Resp SpO2   24 0846 129/62 99.9 °F (37.7 °C) Oral (!) 110 20 95 %   24 0831 130/61 99.2 °F (37.3 °C) Oral (!) 103 15 99 %   24 0735 -- -- -- 96 17 97 %   24 0734 -- -- -- 85 14 97 %   24 0733 -- -- -- 77 15 98 %   24 0732 -- -- -- 95 14 96 %   24 0731 -- -- -- 97 15 97 %   24 0730 -- -- -- (!) 138 16 95 %   24 0703 122/72 -- -- (!) 154 12 97 %   24 0600 -- 99.2 °F (37.3 °C) Oral (!) 158 -- --   24 0400 -- -- -- (!) 106 -- --   24 0331 123/64 99.5 °F (37.5 °C) Oral (!) 102 13 100 %     Temp (24hrs), Av.2 °F (37.3 °C), Min:98.5 °F (36.9 °C), Max:100 °F (37.8 °C)      Pain Control:        Meds:    Current Facility-Administered Medications   Medication Dose Route Frequency    lactated ringers bolus 1,000 mL  1,000 mL IntraVENous Once    0.9 % sodium chloride infusion   IntraVENous PRN    0.9 % sodium chloride infusion   IntraVENous Continuous    sodium chloride flush 0.9 % injection 5-40 mL  5-40 mL IntraVENous 2 times per day    sodium chloride flush 0.9 % injection 5-40 mL  5-40 mL IntraVENous PRN    potassium chloride (KLOR-CON) extended release tablet 40 mEq  40 mEq Oral PRN    Or    potassium bicarb-citric acid (EFFER-K) effervescent tablet 40 mEq  40 mEq Oral PRN    Or    potassium chloride 10 mEq/100 mL IVPB (Peripheral Line)  10 mEq IntraVENous PRN    magnesium sulfate 2000 mg in 50 mL IVPB premix  2,000 mg IntraVENous PRN

## 2024-05-20 NOTE — SIGNIFICANT EVENT
Rapid Response  Rapid response room #442 called overhead at 0610. RRT responding. Devonte, primary RN & covering hospitallist NP, Latasha Cardoso at bedside.    Rapid response called for tachycardia    D/t: New sudden onset tachycardia, HR 150s. BP stable. Afebrile, no c/o CP or SOB, O2 % on 2LNC. Labs drawn, EKG obtained. 1L IVF bolus given. Assisted in transport of pt to CT. 1 unit PRBC ordered pending lab results.    Checked in with primary RN prior to leaving. Opportunity for questions and concerns provided.    DI 58 at the time Rapid Response was called.    Sepsis Screening  Are two or more SIRS criteria present? No    Is the patient's history suggestive of a new infection? No    Communication with provider:    Was a Code Sepsis called at this encounter? No

## 2024-05-20 NOTE — PLAN OF CARE
Problem: Discharge Planning  Goal: Discharge to home or other facility with appropriate resources  5/20/2024 0855 by Ciara Meyer RN  Outcome: Progressing  5/19/2024 2312 by Devonte Fraga RN  Outcome: Progressing  Flowsheets (Taken 5/19/2024 2100)  Discharge to home or other facility with appropriate resources: Identify barriers to discharge with patient and caregiver     Problem: Pain  Goal: Verbalizes/displays adequate comfort level or baseline comfort level  5/20/2024 0855 by Ciara Meyer RN  Outcome: Progressing  Flowsheets (Taken 5/19/2024 2330 by Devonte Fraga RN)  Verbalizes/displays adequate comfort level or baseline comfort level: Encourage patient to monitor pain and request assistance  5/19/2024 2312 by Devonte Fraga RN  Outcome: Progressing     Problem: Skin/Tissue Integrity  Goal: Absence of new skin breakdown  Description: 1.  Monitor for areas of redness and/or skin breakdown  2.  Assess vascular access sites hourly  3.  Every 4-6 hours minimum:  Change oxygen saturation probe site  4.  Every 4-6 hours:  If on nasal continuous positive airway pressure, respiratory therapy assess nares and determine need for appliance change or resting period.  5/20/2024 0855 by Ciara Meyer RN  Outcome: Progressing  5/19/2024 2312 by Devonte Fraga RN  Outcome: Progressing     Problem: ABCDS Injury Assessment  Goal: Absence of physical injury  5/20/2024 0855 by Ciara Meyer RN  Outcome: Progressing  5/19/2024 2312 by Devonte Fraga RN  Outcome: Progressing  Flowsheets (Taken 5/19/2024 2100)  Absence of Physical Injury: Implement safety measures based on patient assessment     Problem: Safety - Adult  Goal: Free from fall injury  5/20/2024 0855 by Ciara Meyer RN  Outcome: Progressing  5/19/2024 2312 by Devonte Fraga RN  Outcome: Progressing  Flowsheets (Taken 5/19/2024 2100)  Free From Fall Injury: Instruct family/caregiver on patient safety

## 2024-05-20 NOTE — PLAN OF CARE
Problem: SLP Adult - Impaired Swallowing  Goal: By Discharge: Advance to least restrictive diet without signs or symptoms of aspiration for planned discharge setting.  See evaluation for individualized goals.  Note: Evaluation 5/20/24  Pt will tolerate the least restrictive diet without overt s/s of aspiration or changes in respiratory status within 7 days     Speech LAnguage Pathology EVALUATION    Patient: Roland Bennett (64 y.o. male)  Date: 5/20/2024  Primary Diagnosis: Closed left hip fracture, initial encounter ([facility]) [S72.002A]  Procedure(s) (LRB):  LEFT HIP HEMIARTHROPLASTY (POSTERIOR).LEFT FEMUR HARDWARE REMOVAL (Left) 2 Days Post-Op   Precautions: Aspiration Fall Risk (WBAT L)                  ASSESSMENT :  Pt is a 65 yo male admitted for left hip hemiarthroplasty after suffering a fall and now post op day 2. RRT called last night d/t tachycardia. PMH significant for alcohol dependence, anemia, dysphagia, malnutrition, and throat cancer. Pt reports he had throat ca approximately 2 years ago with removal, chemo and radiation x6 weeks at the [facility]. Pt has a right neck mass that is non- cancerous but has a history of 'filling with 'puss'' (per pt report). When it is full he will notice a hoarse vocal quality and increased difficulty with food and requires drainage to return to baseline. Pt states the fullness and hoarse vocal quality have been progressively getting worse for about 6-8 months.     Pt observed with his lunch tray consisting of beef, potatoes and cooked carrots. Pt is edentulous and has difficulty masticating hard solids. Pt was picking out the soft carrots / potatoes and proceeded to suck on them until he was able to swallow. He then alternates solids and liquids to help with transit. Pt did not present with any s/s of aspiration and vocal quality remained hoarse and strained throughout.     Recommend downgrading the diet to minced and moist with thin liquids and strict aspiration  Observation  Patient Position: OOB in chair  Vocal Quality: Strain;Hoarse  Consistency Presented: Regular;Pureed;Thin  How Presented: Self-fed/presented;Cup/sip;Spoon;Straw  Bolus Acceptance: No impairment  Bolus Formation/Control: Impaired  Type of Impairment: Delayed  Propulsion: Delayed (# of seconds)  Oral Residue: None  Initiation of Swallow: Delayed (# of seconds)  Aspiration Signs/Symptoms: None  Effective Modifications: Small sips and bites;Alternate liquids/solids    Respiratory Status/Airway:  Room air     Functional Oral Intake Scale (FOIS): 4--Total oral diet of a single consistency    After treatment:   Patient left in no apparent distress sitting up in chair, Call bell left within reach, and Nursing notified    COMMUNICATION/EDUCATION:   The patient's plan of care including recommendations, planned interventions, and recommended diet changes were discussed with: Registered nurse    Patient/family have participated as able in goal setting and plan of care    Thank you,  Layla Cook SLP  Minutes: 14

## 2024-05-20 NOTE — PROGRESS NOTES
PT Contact Note    Attempted to see pt for AM tx session - RN requesting to defer tx at this time 2/2 pt receiving blood and had rapid response this AM with SVT. Will plan to follow up with patient for afternoon session as able.     Thank you,  Marychuy Perez, PT, DPT

## 2024-05-20 NOTE — PROGRESS NOTES
Occupational Therapy  05/20/24    Chart reviewed in prep for OT session. Per PT RN requesting to defer tx at this time 2/2 pt receiving blood and had rapid response this AM with SVT. Will defer and follow up as medically appropriate and able.    Thank you!  Romi Frank, OT

## 2024-05-20 NOTE — CONSENT
Informed Consent for Blood Component Transfusion Note    I have discussed with the patient the rationale for blood component transfusion; its benefits in treating or preventing fatigue, organ damage, or death; and its risk which includes mild transfusion reactions, rare risk of blood borne infection, or more serious but rare reactions. I have discussed the alternatives to transfusion, including the risk and consequences of not receiving transfusion. The patient had an opportunity to ask questions and had agreed to proceed with transfusion of blood components.    Electronically signed by David M Milligram, PA-C on 5/20/24 at 7:18 AM EDT

## 2024-05-20 NOTE — PLAN OF CARE
Problem: Physical Therapy - Adult  Goal: By Discharge: Performs mobility at highest level of function for planned discharge setting.  See evaluation for individualized goals.  Description: FUNCTIONAL STATUS PRIOR TO ADMISSION: Patient was independent and active without use of DME. Reports no falls lately.     HOME SUPPORT PRIOR TO ADMISSION: Resides at [facility].     Physical Therapy Goals  Initiated 5/19/2024  1.  Patient will move from supine to sit and sit to supine, scoot up and down, and roll side to side in bed with contact guard assist within 4 day(s).    2.  Patient will perform sit to stand with contact guard assist within 4 day(s).  3.  Patient will transfer from bed to chair and chair to bed with contact guard assist using the least restrictive device within 4 day(s).  4.  Patient will ambulate with contact guard assist for 50 feet with the least restrictive device within 4 day(s).   5.  Patient will perform posterior hip home exercise program per protocol with supervision/set-up within 4 days.  6. Patient will verbalize and demonstrate understanding of avoiding sudden and extreme movement within 4 days.   7. Patient will demonstrate understanding/maintain weight bearing as tolerated status during functional mobility within 4 days.      Outcome: Progressing    PHYSICAL THERAPY TREATMENT    Patient: Roland Bennett (64 y.o. male)  Date: 5/20/2024  Diagnosis: Closed left hip fracture, initial encounter ([facility]) [S72.002A] Closed left hip fracture, initial encounter ([facility])  Procedure(s) (LRB):  LEFT HIP HEMIARTHROPLASTY (POSTERIOR).LEFT FEMUR HARDWARE REMOVAL (Left) 2 Days Post-Op  Precautions: Fall Risk (WBAT L)                      ASSESSMENT:  Patient continues to benefit from skilled PT services and is progressing towards goals. Pt presents with decreased functional mobility tolerance today 2/2 L knee stiffness and decreased ROM, pain, and tachycardia today. Pt requires CGA - Vamsi for bed mobility,                                                                                                                                                                                                      Pain Ratin/10   Pain Intervention(s):   patient medicated for pain prior to session and elevation    Activity Tolerance:   Good    After treatment:   Patient left in no apparent distress sitting up in chair, Call bell within reach, and Bed/ chair alarm activated      COMMUNICATION/EDUCATION:   The patient's plan of care was discussed with: occupational therapist and registered nurse    Patient Education  Education Given To: Patient  Education Provided: Role of Therapy;Precautions;Fall Prevention Strategies  Education Provided Comments: post hip prec  Education Method: Verbal  Barriers to Learning: None  Education Outcome: Verbalized understanding;Continued education needed      Litzy Perez, PT  Minutes: 27

## 2024-05-21 LAB
ABO + RH BLD: NORMAL
ALBUMIN SERPL-MCNC: 2.4 G/DL (ref 3.5–5)
ALBUMIN/GLOB SERPL: 0.6 (ref 1.1–2.2)
ALP SERPL-CCNC: 125 U/L (ref 45–117)
ALT SERPL-CCNC: 29 U/L (ref 12–78)
ANION GAP SERPL CALC-SCNC: 4 MMOL/L (ref 5–15)
ANTIGENS PRESENT BLD: NORMAL
ANTIGENS PRESENT RBC DONR: NORMAL
ANTIGENS PRESENT RBC DONR: NORMAL
AST SERPL-CCNC: 45 U/L (ref 15–37)
BASOPHILS # BLD: 0.1 K/UL (ref 0–0.1)
BASOPHILS # BLD: 0.1 K/UL (ref 0–0.1)
BASOPHILS NFR BLD: 1 % (ref 0–1)
BASOPHILS NFR BLD: 1 % (ref 0–1)
BILIRUB SERPL-MCNC: 0.7 MG/DL (ref 0.2–1)
BLD PROD TYP BPU: NORMAL
BLD PROD TYP BPU: NORMAL
BLOOD BANK BLOOD PRODUCT EXPIRATION DATE: NORMAL
BLOOD BANK DISPENSE STATUS: NORMAL
BLOOD BANK DISPENSE STATUS: NORMAL
BLOOD BANK ISBT PRODUCT BLOOD TYPE: 5100
BLOOD BANK PRODUCT CODE: NORMAL
BLOOD BANK UNIT TYPE AND RH: NORMAL
BLOOD GROUP ANTIBODIES SERPL: NORMAL
BLOOD GROUP ANTIBODIES SERPL: NORMAL
BPU ID: NORMAL
BPU ID: NORMAL
BUN SERPL-MCNC: 7 MG/DL (ref 6–20)
BUN/CREAT SERPL: 9 (ref 12–20)
CALCIUM SERPL-MCNC: 8.5 MG/DL (ref 8.5–10.1)
CHLORIDE SERPL-SCNC: 106 MMOL/L (ref 97–108)
CO2 SERPL-SCNC: 25 MMOL/L (ref 21–32)
CREAT SERPL-MCNC: 0.8 MG/DL (ref 0.7–1.3)
CROSSMATCH RESULT: NORMAL
CROSSMATCH RESULT: NORMAL
DAT POLY-SP REAG RBC QL: NORMAL
DIFFERENTIAL METHOD BLD: ABNORMAL
DIFFERENTIAL METHOD BLD: ABNORMAL
EOSINOPHIL # BLD: 0.7 K/UL (ref 0–0.4)
EOSINOPHIL # BLD: 0.8 K/UL (ref 0–0.4)
EOSINOPHIL NFR BLD: 7 % (ref 0–7)
EOSINOPHIL NFR BLD: 9 % (ref 0–7)
ERYTHROCYTE [DISTWIDTH] IN BLOOD BY AUTOMATED COUNT: 15.1 % (ref 11.5–14.5)
ERYTHROCYTE [DISTWIDTH] IN BLOOD BY AUTOMATED COUNT: 15.2 % (ref 11.5–14.5)
GLOBULIN SER CALC-MCNC: 4.2 G/DL (ref 2–4)
GLUCOSE SERPL-MCNC: 117 MG/DL (ref 65–100)
HCT VFR BLD AUTO: 23 % (ref 36.6–50.3)
HCT VFR BLD AUTO: 23.6 % (ref 36.6–50.3)
HGB BLD-MCNC: 8 G/DL (ref 12.1–17)
HGB BLD-MCNC: 8.3 G/DL (ref 12.1–17)
IMM GRANULOCYTES # BLD AUTO: 0 K/UL (ref 0–0.04)
IMM GRANULOCYTES # BLD AUTO: 0.1 K/UL (ref 0–0.04)
IMM GRANULOCYTES NFR BLD AUTO: 1 % (ref 0–0.5)
IMM GRANULOCYTES NFR BLD AUTO: 1 % (ref 0–0.5)
LYMPHOCYTES # BLD: 0.9 K/UL (ref 0.8–3.5)
LYMPHOCYTES # BLD: 1.1 K/UL (ref 0.8–3.5)
LYMPHOCYTES NFR BLD: 11 % (ref 12–49)
LYMPHOCYTES NFR BLD: 11 % (ref 12–49)
MCH RBC QN AUTO: 31.7 PG (ref 26–34)
MCH RBC QN AUTO: 31.8 PG (ref 26–34)
MCHC RBC AUTO-ENTMCNC: 34.8 G/DL (ref 30–36.5)
MCHC RBC AUTO-ENTMCNC: 35.2 G/DL (ref 30–36.5)
MCV RBC AUTO: 90.4 FL (ref 80–99)
MCV RBC AUTO: 91.3 FL (ref 80–99)
MONOCYTES # BLD: 1 K/UL (ref 0–1)
MONOCYTES # BLD: 1.3 K/UL (ref 0–1)
MONOCYTES NFR BLD: 12 % (ref 5–13)
MONOCYTES NFR BLD: 13 % (ref 5–13)
NEUTS SEG # BLD: 5.9 K/UL (ref 1.8–8)
NEUTS SEG # BLD: 6.6 K/UL (ref 1.8–8)
NEUTS SEG NFR BLD: 67 % (ref 32–75)
NEUTS SEG NFR BLD: 68 % (ref 32–75)
NRBC # BLD: 0 K/UL (ref 0–0.01)
NRBC # BLD: 0 K/UL (ref 0–0.01)
NRBC BLD-RTO: 0 PER 100 WBC
NRBC BLD-RTO: 0 PER 100 WBC
PLATELET # BLD AUTO: 118 K/UL (ref 150–400)
PLATELET # BLD AUTO: 127 K/UL (ref 150–400)
PMV BLD AUTO: 9.9 FL (ref 8.9–12.9)
PMV BLD AUTO: 9.9 FL (ref 8.9–12.9)
POTASSIUM SERPL-SCNC: 4 MMOL/L (ref 3.5–5.1)
PROT SERPL-MCNC: 6.6 G/DL (ref 6.4–8.2)
RBC # BLD AUTO: 2.52 M/UL (ref 4.1–5.7)
RBC # BLD AUTO: 2.61 M/UL (ref 4.1–5.7)
SODIUM SERPL-SCNC: 135 MMOL/L (ref 136–145)
SPECIMEN EXP DATE BLD: NORMAL
UNIT DIVISION: 0
UNIT DIVISION: 0
UNIT ISSUE DATE/TIME: NORMAL
WBC # BLD AUTO: 8.8 K/UL (ref 4.1–11.1)
WBC # BLD AUTO: 9.8 K/UL (ref 4.1–11.1)

## 2024-05-21 PROCEDURE — 80053 COMPREHEN METABOLIC PANEL: CPT

## 2024-05-21 PROCEDURE — 6370000000 HC RX 637 (ALT 250 FOR IP)

## 2024-05-21 PROCEDURE — 2060000000 HC ICU INTERMEDIATE R&B

## 2024-05-21 PROCEDURE — 97116 GAIT TRAINING THERAPY: CPT

## 2024-05-21 PROCEDURE — 2580000003 HC RX 258: Performed by: PHYSICIAN ASSISTANT

## 2024-05-21 PROCEDURE — 92612 ENDOSCOPY SWALLOW (FEES) VID: CPT

## 2024-05-21 PROCEDURE — 97530 THERAPEUTIC ACTIVITIES: CPT

## 2024-05-21 PROCEDURE — 6370000000 HC RX 637 (ALT 250 FOR IP): Performed by: PHYSICIAN ASSISTANT

## 2024-05-21 PROCEDURE — 6370000000 HC RX 637 (ALT 250 FOR IP): Performed by: FAMILY MEDICINE

## 2024-05-21 PROCEDURE — 6370000000 HC RX 637 (ALT 250 FOR IP): Performed by: ORTHOPAEDIC SURGERY

## 2024-05-21 PROCEDURE — 97535 SELF CARE MNGMENT TRAINING: CPT

## 2024-05-21 PROCEDURE — 2580000003 HC RX 258: Performed by: FAMILY MEDICINE

## 2024-05-21 PROCEDURE — 85025 COMPLETE CBC W/AUTO DIFF WBC: CPT

## 2024-05-21 PROCEDURE — 36415 COLL VENOUS BLD VENIPUNCTURE: CPT

## 2024-05-21 RX ADMIN — DIBASIC SODIUM PHOSPHATE, MONOBASIC POTASSIUM PHOSPHATE AND MONOBASIC SODIUM PHOSPHATE 1 TABLET: 852; 155; 130 TABLET ORAL at 00:19

## 2024-05-21 RX ADMIN — SODIUM CHLORIDE, PRESERVATIVE FREE 10 ML: 5 INJECTION INTRAVENOUS at 09:22

## 2024-05-21 RX ADMIN — ACETAMINOPHEN 650 MG: 325 TABLET ORAL at 16:38

## 2024-05-21 RX ADMIN — SODIUM CHLORIDE, PRESERVATIVE FREE 10 ML: 5 INJECTION INTRAVENOUS at 20:44

## 2024-05-21 RX ADMIN — SODIUM CHLORIDE: 9 INJECTION, SOLUTION INTRAVENOUS at 23:41

## 2024-05-21 RX ADMIN — SODIUM CHLORIDE: 9 INJECTION, SOLUTION INTRAVENOUS at 15:48

## 2024-05-21 RX ADMIN — OXYCODONE 10 MG: 5 TABLET ORAL at 23:39

## 2024-05-21 RX ADMIN — SODIUM CHLORIDE: 9 INJECTION, SOLUTION INTRAVENOUS at 07:31

## 2024-05-21 RX ADMIN — ACETAMINOPHEN 650 MG: 325 TABLET ORAL at 06:31

## 2024-05-21 RX ADMIN — ASPIRIN 81 MG CHEWABLE TABLET 81 MG: 81 TABLET CHEWABLE at 20:43

## 2024-05-21 RX ADMIN — Medication 100 MG: at 09:21

## 2024-05-21 RX ADMIN — THERA TABS 1 TABLET: TAB at 09:21

## 2024-05-21 RX ADMIN — ACETAMINOPHEN 650 MG: 325 TABLET ORAL at 11:22

## 2024-05-21 RX ADMIN — SODIUM CHLORIDE, PRESERVATIVE FREE 10 ML: 5 INJECTION INTRAVENOUS at 20:43

## 2024-05-21 RX ADMIN — Medication 1 MG: at 09:21

## 2024-05-21 RX ADMIN — ASPIRIN 81 MG CHEWABLE TABLET 81 MG: 81 TABLET CHEWABLE at 09:21

## 2024-05-21 ASSESSMENT — PAIN DESCRIPTION - ORIENTATION
ORIENTATION: LEFT

## 2024-05-21 ASSESSMENT — PAIN DESCRIPTION - DESCRIPTORS
DESCRIPTORS: ACHING
DESCRIPTORS: SHARP
DESCRIPTORS: ACHING

## 2024-05-21 ASSESSMENT — PAIN DESCRIPTION - LOCATION
LOCATION: HIP
LOCATION: KNEE
LOCATION: HIP

## 2024-05-21 ASSESSMENT — PAIN SCALES - GENERAL
PAINLEVEL_OUTOF10: 0
PAINLEVEL_OUTOF10: 4
PAINLEVEL_OUTOF10: 0
PAINLEVEL_OUTOF10: 8

## 2024-05-21 NOTE — PROGRESS NOTES
SLP Contact Note    Will plan to complete Flexible Endoscopic Evaluation of Swallow (FEES) at bedside today around ~1130 to objective assess safety of swallow and informally assess pharyngeal anatomy.    Kary Gordillo M.Ed, PhD(c), CCC-SLP  Speech-Language Pathologist

## 2024-05-21 NOTE — PLAN OF CARE
Problem: Occupational Therapy - Adult  Goal: By Discharge: Performs self-care activities at highest level of function for planned discharge setting.  See evaluation for individualized goals.  Description: FUNCTIONAL STATUS PRIOR TO ADMISSION:  Patient resided in [facility] for 2 years, able to complete dressing and bathing without assistance.    Receives Help From: Other (comment) (staff at the facility),  ,  ,  ,  ,  ,  ,  , Ambulation Assistance: Needs assistance, Transfer Assistance: Needs assistance (walker), Active : No         HOME SUPPORT: Patient lived in [facility].     Occupational Therapy Goals  Initiated 5/19/2024    1. Patient will perform lower body dressing with AE PRN with Minimal Assist within 7 day(s).  2. Patient will perform upper body ADLS standing for 5 minutes without fatigue or LOB with Contact Guard Assist within 7 days.  3. Patient will perform toilet transfers with Contact Guard Assist using Bedside Commode Over Toilet within 7 days.  4. Patient will perform all aspects of toileting at Contact Guard Assist within 7 days.  5. Patient will utilize energy conservation techniques during functional activities without cues within 7 day(s).      Outcome: Progressing     OCCUPATIONAL THERAPY TREATMENT  Patient: Roland Bennett (64 y.o. male)  Date: 5/21/2024  Primary Diagnosis: Closed left hip fracture, initial encounter ([facility]) [S72.002A]  Procedure(s) (LRB):  LEFT HIP HEMIARTHROPLASTY (POSTERIOR).LEFT FEMUR HARDWARE REMOVAL (Left) 3 Days Post-Op   Precautions: Fall Risk (WBAT L)                Chart, occupational therapy assessment, plan of care, and goals were reviewed.    ASSESSMENT  Patient continues to benefit from skilled OT services and is progressing towards goals but remains limited by RLE deficits (pain, ROM, strength), balance, activity tolerance, general weakness, and cognition. Patient tolerated increased weight bearing through RLE during functional mobility to door but still  therapist, speech therapist, and registered nurse    Patient Education  Education Given To: Patient  Education Provided: ADL Adaptive Strategies;Transfer Training;Precautions;Role of Therapy;Plan of Care;Mobility Training;Equipment  Education Method: Verbal;Demonstration;Teach Back  Barriers to Learning: None;Cognition  Education Outcome: Verbalized understanding;Continued education needed;Demonstrated understanding    Thank you for this referral.  Missy Mary OT  Minutes: 31

## 2024-05-21 NOTE — PLAN OF CARE
Problem: Skin/Tissue Integrity  Goal: Absence of new skin breakdown  Description: 1.  Monitor for areas of redness and/or skin breakdown  2.  Assess vascular access sites hourly  3.  Every 4-6 hours minimum:  Change oxygen saturation probe site  4.  Every 4-6 hours:  If on nasal continuous positive airway pressure, respiratory therapy assess nares and determine need for appliance change or resting period.  Outcome: Progressing     Problem: ABCDS Injury Assessment  Goal: Absence of physical injury  Outcome: Progressing     Problem: Safety - Adult  Goal: Free from fall injury  Outcome: Progressing     Problem: Occupational Therapy - Adult  Goal: By Discharge: Performs self-care activities at highest level of function for planned discharge setting.  See evaluation for individualized goals.  Description: FUNCTIONAL STATUS PRIOR TO ADMISSION:  Patient resided in [facility] for 2 years, able to complete dressing and bathing without assistance.    Receives Help From: Other (comment) (staff at the facility),  ,  ,  ,  ,  ,  ,  , Ambulation Assistance: Needs assistance, Transfer Assistance: Needs assistance (walker), Active : No         HOME SUPPORT: Patient lived in [facility].     Occupational Therapy Goals  Initiated 5/19/2024    1. Patient will perform lower body dressing with AE PRN with Minimal Assist within 7 day(s).  2. Patient will perform upper body ADLS standing for 5 minutes without fatigue or LOB with Contact Guard Assist within 7 days.  3. Patient will perform toilet transfers with Contact Guard Assist using Bedside Commode Over Toilet within 7 days.  4. Patient will perform all aspects of toileting at Contact Guard Assist within 7 days.  5. Patient will utilize energy conservation techniques during functional activities without cues within 7 day(s).      5/20/2024 1413 by Romi Frank, KATIE  Outcome: Progressing     Problem: Physical Therapy - Adult  Goal: By Discharge: Performs mobility at highest

## 2024-05-21 NOTE — PLAN OF CARE
Problem: Physical Therapy - Adult  Goal: By Discharge: Performs mobility at highest level of function for planned discharge setting.  See evaluation for individualized goals.  Description: FUNCTIONAL STATUS PRIOR TO ADMISSION: Patient was independent and active without use of DME. Reports no falls lately.     HOME SUPPORT PRIOR TO ADMISSION: Resides at [facility].     Physical Therapy Goals  Initiated 5/19/2024  1.  Patient will move from supine to sit and sit to supine, scoot up and down, and roll side to side in bed with contact guard assist within 4 day(s).    2.  Patient will perform sit to stand with contact guard assist within 4 day(s).  3.  Patient will transfer from bed to chair and chair to bed with contact guard assist using the least restrictive device within 4 day(s).  4.  Patient will ambulate with contact guard assist for 50 feet with the least restrictive device within 4 day(s).   5.  Patient will perform posterior hip home exercise program per protocol with supervision/set-up within 4 days.  6. Patient will verbalize and demonstrate understanding of avoiding sudden and extreme movement within 4 days.   7. Patient will demonstrate understanding/maintain weight bearing as tolerated status during functional mobility within 4 days.      Outcome: Progressing    PHYSICAL THERAPY TREATMENT-MORNING SESSION    Patient: Roland Bennett (64 y.o. male)  Date: 5/21/2024  Diagnosis: Closed left hip fracture, initial encounter ([facility]) [S72.002A] Closed left hip fracture, initial encounter ([facility])  Procedure(s) (LRB):  LEFT HIP HEMIARTHROPLASTY (POSTERIOR).LEFT FEMUR HARDWARE REMOVAL (Left) 3 Days Post-Op  Precautions: Fall Risk (WBAT L)                    ASSESSMENT:  Patient was seen for morning PT session. He is walking 20 feet at [facility] level of assistance and with a RW . The primary limiting factors are pain, drainage from incision. The full therapy note will follow after this afternoon's session.          SUBJECTIVE:   Patient agreeable to participate with PT.     OBJECTIVE DATA SUMMARY:     Functional Mobility Training for morning session:  Bed Mobility:  Bed Mobility Training  Bed Mobility Training: Yes  Supine to Sit: Assist X1;Contact-guard assistance  Scooting: Stand-by assistance;Assist X1  - Requires CGA for LLE management    Transfers:  Transfer Training  Transfer Training: Yes  Overall Level of Assistance: Contact-guard assistance;Assist X1  Sit to Stand: Contact-guard assistance;Assist X1  Stand to Sit: Contact-guard assistance;Assist X1  - From EOB x 1 with bed height elevated to assist    Balance:  Balance  Sitting: Intact  Sitting - Static: Good (unsupported)  Sitting - Dynamic: Good (unsupported)  Standing: Impaired  Standing - Static: Constant support;Good  Standing - Dynamic: Fair;Constant support  - Maintains static standing x 3 mins with CGA while RN changing L hip post/lateral dressing     Ambulation/Gait Training:     Gait  Gait Training: Yes  Left Side Weight Bearing: As tolerated  Right Side Weight Bearing: Full  Overall Level of Assistance: Contact-guard assistance;Assist X1  Distance (ft): 20 Feet  Assistive Device: Walker, rolling;Gait belt  Base of Support: Shift to right  Speed/Smita: Pace decreased (< 100 feet/min)  Step Length: Right shortened;Left shortened  Swing Pattern: Left asymmetrical  Stance: Left decreased  Gait Abnormalities: Antalgic            Pain Rating:  - Pain not rated; reports increased in knee with L knee flex    Pain Intervention(s):   nursing notified, elevation, and repositioning    Activity Tolerance:   Good    After treatment:   Patient left in no apparent distress sitting up in chair, Call bell within reach, and Bed/ chair alarm activated      COMMUNICATION/EDUCATION:   The patient's plan of care was discussed with: occupational therapist and registered nurse    Patient Education  Education Given To: Patient  Education Provided: Role of

## 2024-05-21 NOTE — PROGRESS NOTES
Speech Language Pathology  Flexible Endoscopic Evaluation of Swallowing-FEES  Patient: Roland Bennett (64 y.o. male)  Date: 5/21/2024  Primary Diagnosis: Closed left hip fracture, initial encounter ([facility]) [S72.002A]  Procedure(s) (LRB):  LEFT HIP HEMIARTHROPLASTY (POSTERIOR).LEFT FEMUR HARDWARE REMOVAL (Left) 3 Days Post-Op   Precautions:   Fall Risk (WBAT L)                  ASSESSMENT :  SLP FEES complete. Pt has airway compromise with all consistencies trialed (thin, mildly-thick liquids, puree) with presumable aspiration, but difficult to assess swallowing mechanism in this modality due to decreased visualization (likely mass effect).  Pt does have a cough with notable bolus expelled from the laryngeal vestibule, but true vocal folds and trachea were unable to be visualized. Discussed this at length with patient. Patient states he would never want a feeding tube and would rather die than get a feeding tube. SLP respects this, but did explain that completing a repeat MBS would be helpful so that SLP can trial strategies to see if we can make the swallow mechanism safer and more efficiency. Pt in agreement with this plan. In the interim, given that pt has been eating/drinking, is sensate to aspiration, and white blood cell count is stable with no signs of chest infection/pneumonia, would allow pt to continue minced and moist diet and thin liquids. Will complete MBS tomorrow to assess for further improvement in efficiency and safety of swallow. Although pt initially resistant to the idea of repeat MBS, pt ultimately agreed when SLP explained that this could inform strategies to make swallowing more comfortable. Further recommendations to follow.    Patient will benefit from skilled intervention to address the above impairments.     Images taken from FEES:    Poor visualization of upper airway throughout study.      PLAN :  Recommendations and Planned Interventions:  Diet: Minced and moist and thin liquids  MBS  tomorrow per patient, SLP, and radiology schedule  Note that patient has stated he does not want a feeding tube even if he is aspirating everything. This is reasonable particularly given that he has not had recurrent signs of aspiration pneumonia. Only completing MBS to see if there are any compensatory strategies to make eating/drinking more comfortable.     Juanjose (2006)'s Pillars of Aspiration Pneumonia:      Patient may have aspiration, but if oral health status is good there is a lower risk for aspiration.        Acute SLP Services: Yes, patient will be followed by speech-language pathology 3x/week to address goals. Patient's rehabilitation potential is considered to be Guarded.  Discharge Recommendations: Continue to assess pending progress     SUBJECTIVE:   Patient stated “Yeah, I doubt that,” when other SLP helping this SLP stated that this writer was one of the best endoscopists around in an attempt to calm his nerves about test.    OBJECTIVE:   History reviewed. No pertinent past medical history.  Past Surgical History:   Procedure Laterality Date    HIP SURGERY Left 5/18/2024    LEFT HIP HEMIARTHROPLASTY (POSTERIOR).LEFT FEMUR HARDWARE REMOVAL performed by Flakito Merrill MD at [facility] MAIN OR     Prior Level of Function/Home Situation:   Social/Functional History  Lives With:  ([facility])  Type of Home: Facility  Bathroom Accessibility: Walker accessible  Receives Help From: Other (comment) (staff at the facility)  Ambulation Assistance: Independent  Transfer Assistance: Needs assistance (walker)  Active : No  Occupation: On disability  Diet prior to admission: regular diet/thin liquids  Current Diet:  minced and moist diet/thin liquids     Cognitive and Communication Status:  Neurologic State: Alert  Orientation Level: Oriented x4  Cognition: Appropriate decision making and Follows commands    History/indication for procedure: History of neck mass with current abscess. Pt reports not being able

## 2024-05-21 NOTE — PLAN OF CARE
Problem: Physical Therapy - Adult  Goal: By Discharge: Performs mobility at highest level of function for planned discharge setting.  See evaluation for individualized goals.  Description: FUNCTIONAL STATUS PRIOR TO ADMISSION: Patient was independent and active without use of DME. Reports no falls lately.     HOME SUPPORT PRIOR TO ADMISSION: Resides at [facility].     Physical Therapy Goals  Initiated 5/19/2024  1.  Patient will move from supine to sit and sit to supine, scoot up and down, and roll side to side in bed with contact guard assist within 4 day(s).    2.  Patient will perform sit to stand with contact guard assist within 4 day(s).  3.  Patient will transfer from bed to chair and chair to bed with contact guard assist using the least restrictive device within 4 day(s).  4.  Patient will ambulate with contact guard assist for 50 feet with the least restrictive device within 4 day(s).   5.  Patient will perform posterior hip home exercise program per protocol with supervision/set-up within 4 days.  6. Patient will verbalize and demonstrate understanding of avoiding sudden and extreme movement within 4 days.   7. Patient will demonstrate understanding/maintain weight bearing as tolerated status during functional mobility within 4 days.      5/21/2024 1426 by Litzy Perez, PT  Outcome: Progressing  5/21/2024 1131 by Litzy Perez, PT  Outcome: Progressing     PHYSICAL THERAPY TREATMENT-AFTERNOON SESSION    Patient: Roland Bennett (64 y.o. male)  Date: 5/21/2024  Diagnosis: Closed left hip fracture, initial encounter ([facility]) [S72.002A] Closed left hip fracture, initial encounter ([facility])  Procedure(s) (LRB):  LEFT HIP HEMIARTHROPLASTY (POSTERIOR).LEFT FEMUR HARDWARE REMOVAL (Left) 3 Days Post-Op  Precautions: Fall Risk (WBAT L)                    ASSESSMENT:  Patient continues to benefit from skilled PT services and is progressing towards goals. Patient is now ambulating 30 ft with contact guard assist

## 2024-05-21 NOTE — PROGRESS NOTES
Orthopedic Progress Note    S: Pain is improved from yesterday; endorses he is moving around more in the bed, feels better status post transfusion;Denies numbness, tingling, focal weakness, cp, sob, fever, chills    O: NAD, respirations unlabored; Dressings with staining, but dry and intact; thigh soft, no edema, no posterior calf ttp; DF/PF 5/5, SILT; Cap refill brisk, foot warm, DP2+    Patient Vitals for the past 4 hrs:   Temp Pulse BP   05/21/24 1012 98.7 °F (37.1 °C) 97 116/65   05/21/24 1000 -- 91 --     Recent Labs     05/20/24  2239 05/21/24  0424   HGB 8.3* 8.0*   HCT 23.6* 23.0*   * 127*   BUN 7 7   K 4.1 4.0   * 135*            A/P:  Procedure: Procedure(s):  LEFT HIP HEMIARTHROPLASTY (POSTERIOR).LEFT FEMUR HARDWARE REMOVAL  Post Op day: 3 Days Post-Op    - DVT ppx - ASA 81mg BID x 28 days; Hgb appears stablized, would restart; SCDs  - PT/OT - WBAT  - Pain - APAP, Oxy prn; Ice, Elevation, Ace wrap as needed  - Dressing - Leave in place if it remains dry and intact; change as needed for saturation with dry sterile dressings; if still leaking tomorrow, consult wound care for vac assisted dressing.   - Dispo - Pending PT/OT recs; needs f/u in 3 weeks with Dr. Merrill; refer to DC instructions for further details.    LORENA Johnson  Orthopedic Trauma Service  [facility]

## 2024-05-21 NOTE — PROGRESS NOTES
[facility]                                                                                          Hospitalist Progress Note  Olena Olivarez MD  Answering service: [phone] OR [phone] from in house phone        Date of Service:  2024  NAME:  Roland Bennett  :  1960  MRN:  [id number]      Admission Summary:   Roland Bennett is a 64 y.o. male with a past medical history of alcohol dependence, anemia, dysphagia, malnutrition, and throat cancer who presented after ground-level fall at [facility].  Patient had been drinking alcohol, tripped and fell in the parking lot at [facility], and injured his left hip. Upon arrival to the ED, he was found to have a left hip fracture. He states that he does not drink daily, but did have 2 drinks consisting of a 40 ounce of beer, and a 24 ounce beers.     In the ED, vital signs stable. Labs show ethanol 240, hemoglobin 11, and sodium 126    Interval history / Subjective:   Patient was seen and examined.  He is sitting in a chair by the bedside, denied chest pain, palpitation, dizziness or any complaints.  Patient is calm.  Discussed with RN at the bedside, left hip surgical dressing saturating quickly, orthopedic team made aware.       Assessment & Plan:     Left Femoral Neck Fracture  - Status post ground-level fall while intoxicated with alcohol  - Ortho following: S/P OR with Dr. Merrill on    - Pain control: Acetaminophen, Oxycodone, Morphine for breakthrough; Lido patch, Ice  - Neurovascular checks q4hrs  - PT/OT to see today  - PRN pain control  - Follow H&H post-operatively    Sinus tachycardia  - Overnight  patient was found to be in sinus tachycardia with a rate in the 150s, night team ordered CTA chest, and labs  - Suspect post-op blood loss vs PE, less likely EtOH withdrawal given absence of symptoms. Patient reports palpitations but otherwise denies any other symptoms currently. He denies any chest pain,  4 mg  4 mg IntraVENous Q4H PRN    naloxone (NARCAN) injection 0.4 mg  0.4 mg IntraVENous PRN    sodium chloride flush 0.9 % injection 5-40 mL  5-40 mL IntraVENous 2 times per day    sodium chloride flush 0.9 % injection 5-40 mL  5-40 mL IntraVENous PRN    0.9 % sodium chloride infusion   IntraVENous PRN    thiamine tablet 100 mg  100 mg Oral Daily    LORazepam (ATIVAN) tablet 1 mg  1 mg Oral Q1H PRN    Or    LORazepam (ATIVAN) injection 1 mg  1 mg IntraVENous Q1H PRN    Or    LORazepam (ATIVAN) tablet 2 mg  2 mg Oral Q1H PRN    Or    LORazepam (ATIVAN) injection 2 mg  2 mg IntraVENous Q1H PRN    Or    LORazepam (ATIVAN) tablet 3 mg  3 mg Oral Q1H PRN    Or    LORazepam (ATIVAN) injection 3 mg  3 mg IntraVENous Q1H PRN    Or    LORazepam (ATIVAN) tablet 4 mg  4 mg Oral Q1H PRN    Or    LORazepam (ATIVAN) injection 4 mg  4 mg IntraVENous Q1H PRN    folic acid (FOLVITE) tablet 1 mg  1 mg Oral Daily    multivitamin 1 tablet  1 tablet Oral Daily    acetaminophen (TYLENOL) tablet 650 mg  650 mg Oral Q6H    lidocaine 4 % external patch 1 patch  1 patch TransDERmal Daily    senna (SENOKOT) tablet 8.6 mg  1 tablet Oral Daily PRN    oxyCODONE (ROXICODONE) immediate release tablet 5 mg  5 mg Oral Q4H PRN    oxyCODONE (ROXICODONE) immediate release tablet 10 mg  10 mg Oral Q4H PRN     ______________________________________________________________________  EXPECTED LENGTH OF STAY: 5  ACTUAL LENGTH OF STAY:          4                 Olena Olivarez MD

## 2024-05-22 ENCOUNTER — APPOINTMENT (OUTPATIENT)
Facility: HOSPITAL | Age: 64
End: 2024-05-22
Attending: HOSPITALIST
Payer: MEDICAID

## 2024-05-22 ENCOUNTER — APPOINTMENT (OUTPATIENT)
Facility: HOSPITAL | Age: 64
End: 2024-05-22
Payer: MEDICAID

## 2024-05-22 LAB
ALBUMIN SERPL-MCNC: 2.2 G/DL (ref 3.5–5)
ALBUMIN/GLOB SERPL: 0.5 (ref 1.1–2.2)
ALP SERPL-CCNC: 175 U/L (ref 45–117)
ALT SERPL-CCNC: 46 U/L (ref 12–78)
ANION GAP SERPL CALC-SCNC: 4 MMOL/L (ref 5–15)
AST SERPL-CCNC: 73 U/L (ref 15–37)
BACTERIA SPEC CULT: NORMAL
BASOPHILS # BLD: 0.1 K/UL (ref 0–0.1)
BASOPHILS NFR BLD: 1 % (ref 0–1)
BILIRUB SERPL-MCNC: 0.5 MG/DL (ref 0.2–1)
BUN SERPL-MCNC: 8 MG/DL (ref 6–20)
BUN/CREAT SERPL: 10 (ref 12–20)
CALCIUM SERPL-MCNC: 8.4 MG/DL (ref 8.5–10.1)
CHLORIDE SERPL-SCNC: 105 MMOL/L (ref 97–108)
CO2 SERPL-SCNC: 25 MMOL/L (ref 21–32)
CREAT SERPL-MCNC: 0.79 MG/DL (ref 0.7–1.3)
DIFFERENTIAL METHOD BLD: ABNORMAL
ECHO AO ASC DIAM: 3.4 CM
ECHO AO ASCENDING AORTA INDEX: 1.73 CM/M2
ECHO AO ROOT DIAM: 3.1 CM
ECHO AO ROOT INDEX: 1.58 CM/M2
ECHO AV AREA PEAK VELOCITY: 2.7 CM2
ECHO AV AREA/BSA PEAK VELOCITY: 1.4 CM2/M2
ECHO AV PEAK GRADIENT: 6 MMHG
ECHO AV PEAK VELOCITY: 1.2 M/S
ECHO AV VELOCITY RATIO: 0.83
ECHO BSA: 1.94 M2
ECHO EST RA PRESSURE: 8 MMHG
ECHO LA DIAMETER INDEX: 1.73 CM/M2
ECHO LA DIAMETER: 3.4 CM
ECHO LA TO AORTIC ROOT RATIO: 1.1
ECHO LA VOL A-L A4C: 65 ML (ref 18–58)
ECHO LA VOL MOD A4C: 60 ML (ref 18–58)
ECHO LA VOLUME INDEX A-L A4C: 33 ML/M2 (ref 16–34)
ECHO LA VOLUME INDEX MOD A4C: 31 ML/M2 (ref 16–34)
ECHO LV E' LATERAL VELOCITY: 13 CM/S
ECHO LV E' SEPTAL VELOCITY: 12 CM/S
ECHO LV FRACTIONAL SHORTENING: 43 % (ref 28–44)
ECHO LV INTERNAL DIMENSION DIASTOLE INDEX: 2.24 CM/M2
ECHO LV INTERNAL DIMENSION DIASTOLIC: 4.4 CM (ref 4.2–5.9)
ECHO LV INTERNAL DIMENSION SYSTOLIC INDEX: 1.28 CM/M2
ECHO LV INTERNAL DIMENSION SYSTOLIC: 2.5 CM
ECHO LV IVSD: 1.2 CM (ref 0.6–1)
ECHO LV MASS 2D: 168.9 G (ref 88–224)
ECHO LV MASS INDEX 2D: 86.2 G/M2 (ref 49–115)
ECHO LV POSTERIOR WALL DIASTOLIC: 1 CM (ref 0.6–1)
ECHO LV RELATIVE WALL THICKNESS RATIO: 0.45
ECHO LVOT AREA: 3.1 CM2
ECHO LVOT DIAM: 2 CM
ECHO LVOT PEAK GRADIENT: 4 MMHG
ECHO LVOT PEAK VELOCITY: 1 M/S
ECHO MV A VELOCITY: 1.04 M/S
ECHO MV AREA PHT: 5 CM2
ECHO MV E DECELERATION TIME (DT): 152.6 MS
ECHO MV E VELOCITY: 1.1 M/S
ECHO MV E/A RATIO: 1.06
ECHO MV E/E' LATERAL: 8.46
ECHO MV E/E' RATIO (AVERAGED): 8.81
ECHO MV E/E' SEPTAL: 9.17
ECHO MV MAX VELOCITY: 1.2 M/S
ECHO MV MEAN GRADIENT: 2 MMHG
ECHO MV MEAN VELOCITY: 0.7 M/S
ECHO MV PEAK GRADIENT: 6 MMHG
ECHO MV PRESSURE HALF TIME (PHT): 44.3 MS
ECHO MV REGURGITANT PEAK GRADIENT: 61 MMHG
ECHO MV REGURGITANT PEAK VELOCITY: 3.9 M/S
ECHO MV VTI: 28.3 CM
ECHO PULMONARY ARTERY END DIASTOLIC PRESSURE: 17 MMHG
ECHO PV MAX VELOCITY: 1.1 M/S
ECHO PV PEAK GRADIENT: 5 MMHG
ECHO RIGHT VENTRICULAR SYSTOLIC PRESSURE (RVSP): 50 MMHG
ECHO RV FREE WALL PEAK S': 11 CM/S
ECHO RV TAPSE: 2 CM (ref 1.7–?)
ECHO TV REGURGITANT MAX VELOCITY: 3.24 M/S
ECHO TV REGURGITANT PEAK GRADIENT: 42 MMHG
EOSINOPHIL # BLD: 0.8 K/UL (ref 0–0.4)
EOSINOPHIL NFR BLD: 11 % (ref 0–7)
ERYTHROCYTE [DISTWIDTH] IN BLOOD BY AUTOMATED COUNT: 15.1 % (ref 11.5–14.5)
GLOBULIN SER CALC-MCNC: 4.3 G/DL (ref 2–4)
GLUCOSE SERPL-MCNC: 116 MG/DL (ref 65–100)
HCT VFR BLD AUTO: 21.5 % (ref 36.6–50.3)
HCT VFR BLD AUTO: 23 % (ref 36.6–50.3)
HCT VFR BLD AUTO: 23.1 % (ref 36.6–50.3)
HGB BLD-MCNC: 7.2 G/DL (ref 12.1–17)
HGB BLD-MCNC: 7.7 G/DL (ref 12.1–17)
HGB BLD-MCNC: 8 G/DL (ref 12.1–17)
IMM GRANULOCYTES # BLD AUTO: 0 K/UL (ref 0–0.04)
IMM GRANULOCYTES NFR BLD AUTO: 0 % (ref 0–0.5)
LYMPHOCYTES # BLD: 0.8 K/UL (ref 0.8–3.5)
LYMPHOCYTES NFR BLD: 10 % (ref 12–49)
MCH RBC QN AUTO: 31.2 PG (ref 26–34)
MCHC RBC AUTO-ENTMCNC: 33.5 G/DL (ref 30–36.5)
MCV RBC AUTO: 93.1 FL (ref 80–99)
MONOCYTES # BLD: 1 K/UL (ref 0–1)
MONOCYTES NFR BLD: 13 % (ref 5–13)
NEUTS SEG # BLD: 4.8 K/UL (ref 1.8–8)
NEUTS SEG NFR BLD: 65 % (ref 32–75)
NRBC # BLD: 0 K/UL (ref 0–0.01)
NRBC BLD-RTO: 0 PER 100 WBC
PLATELET # BLD AUTO: 141 K/UL (ref 150–400)
PMV BLD AUTO: 9.7 FL (ref 8.9–12.9)
POTASSIUM SERPL-SCNC: 3.6 MMOL/L (ref 3.5–5.1)
PROT SERPL-MCNC: 6.5 G/DL (ref 6.4–8.2)
RBC # BLD AUTO: 2.31 M/UL (ref 4.1–5.7)
RBC MORPH BLD: ABNORMAL
RBC MORPH BLD: ABNORMAL
SERVICE CMNT-IMP: NORMAL
SODIUM SERPL-SCNC: 134 MMOL/L (ref 136–145)
WBC # BLD AUTO: 7.5 K/UL (ref 4.1–11.1)

## 2024-05-22 PROCEDURE — 6370000000 HC RX 637 (ALT 250 FOR IP): Performed by: PHYSICIAN ASSISTANT

## 2024-05-22 PROCEDURE — 2060000000 HC ICU INTERMEDIATE R&B

## 2024-05-22 PROCEDURE — 97530 THERAPEUTIC ACTIVITIES: CPT

## 2024-05-22 PROCEDURE — 6360000002 HC RX W HCPCS: Performed by: FAMILY MEDICINE

## 2024-05-22 PROCEDURE — 2580000003 HC RX 258: Performed by: PHYSICIAN ASSISTANT

## 2024-05-22 PROCEDURE — 2580000003 HC RX 258: Performed by: FAMILY MEDICINE

## 2024-05-22 PROCEDURE — 97116 GAIT TRAINING THERAPY: CPT

## 2024-05-22 PROCEDURE — 36415 COLL VENOUS BLD VENIPUNCTURE: CPT

## 2024-05-22 PROCEDURE — 93306 TTE W/DOPPLER COMPLETE: CPT | Performed by: INTERNAL MEDICINE

## 2024-05-22 PROCEDURE — 6370000000 HC RX 637 (ALT 250 FOR IP): Performed by: FAMILY MEDICINE

## 2024-05-22 PROCEDURE — 6370000000 HC RX 637 (ALT 250 FOR IP): Performed by: ORTHOPAEDIC SURGERY

## 2024-05-22 PROCEDURE — 80053 COMPREHEN METABOLIC PANEL: CPT

## 2024-05-22 PROCEDURE — 85018 HEMOGLOBIN: CPT

## 2024-05-22 PROCEDURE — 85014 HEMATOCRIT: CPT

## 2024-05-22 PROCEDURE — 93306 TTE W/DOPPLER COMPLETE: CPT

## 2024-05-22 PROCEDURE — 92611 MOTION FLUOROSCOPY/SWALLOW: CPT | Performed by: SPEECH-LANGUAGE PATHOLOGIST

## 2024-05-22 PROCEDURE — 85025 COMPLETE CBC W/AUTO DIFF WBC: CPT

## 2024-05-22 PROCEDURE — 74230 X-RAY XM SWLNG FUNCJ C+: CPT

## 2024-05-22 RX ADMIN — ACETAMINOPHEN 650 MG: 325 TABLET ORAL at 16:48

## 2024-05-22 RX ADMIN — SODIUM CHLORIDE, PRESERVATIVE FREE 10 ML: 5 INJECTION INTRAVENOUS at 08:50

## 2024-05-22 RX ADMIN — OXYCODONE 10 MG: 5 TABLET ORAL at 12:24

## 2024-05-22 RX ADMIN — ASPIRIN 81 MG CHEWABLE TABLET 81 MG: 81 TABLET CHEWABLE at 19:35

## 2024-05-22 RX ADMIN — ACETAMINOPHEN 650 MG: 325 TABLET ORAL at 12:23

## 2024-05-22 RX ADMIN — MORPHINE SULFATE 4 MG: 4 INJECTION, SOLUTION INTRAMUSCULAR; INTRAVENOUS at 15:07

## 2024-05-22 RX ADMIN — ACETAMINOPHEN 650 MG: 325 TABLET ORAL at 23:43

## 2024-05-22 RX ADMIN — ACETAMINOPHEN 650 MG: 325 TABLET ORAL at 00:07

## 2024-05-22 RX ADMIN — OXYCODONE 10 MG: 5 TABLET ORAL at 19:35

## 2024-05-22 RX ADMIN — SODIUM CHLORIDE: 9 INJECTION, SOLUTION INTRAVENOUS at 08:26

## 2024-05-22 RX ADMIN — ACETAMINOPHEN 650 MG: 325 TABLET ORAL at 05:03

## 2024-05-22 RX ADMIN — Medication 100 MG: at 08:49

## 2024-05-22 RX ADMIN — Medication 1 MG: at 08:49

## 2024-05-22 RX ADMIN — THERA TABS 1 TABLET: TAB at 08:49

## 2024-05-22 RX ADMIN — ASPIRIN 81 MG CHEWABLE TABLET 81 MG: 81 TABLET CHEWABLE at 08:49

## 2024-05-22 ASSESSMENT — PAIN DESCRIPTION - ONSET
ONSET: ON-GOING
ONSET: ON-GOING

## 2024-05-22 ASSESSMENT — PAIN DESCRIPTION - FREQUENCY
FREQUENCY: CONTINUOUS
FREQUENCY: CONTINUOUS

## 2024-05-22 ASSESSMENT — PAIN DESCRIPTION - DESCRIPTORS
DESCRIPTORS: ACHING

## 2024-05-22 ASSESSMENT — PAIN SCALES - GENERAL
PAINLEVEL_OUTOF10: 7
PAINLEVEL_OUTOF10: 5
PAINLEVEL_OUTOF10: 0
PAINLEVEL_OUTOF10: 8
PAINLEVEL_OUTOF10: 4
PAINLEVEL_OUTOF10: 5
PAINLEVEL_OUTOF10: 8
PAINLEVEL_OUTOF10: 7

## 2024-05-22 ASSESSMENT — PAIN DESCRIPTION - LOCATION
LOCATION: HIP

## 2024-05-22 ASSESSMENT — PAIN DESCRIPTION - PAIN TYPE
TYPE: SURGICAL PAIN
TYPE: SURGICAL PAIN

## 2024-05-22 ASSESSMENT — PAIN DESCRIPTION - ORIENTATION
ORIENTATION: LEFT

## 2024-05-22 ASSESSMENT — PAIN - FUNCTIONAL ASSESSMENT
PAIN_FUNCTIONAL_ASSESSMENT: ACTIVITIES ARE NOT PREVENTED

## 2024-05-22 NOTE — PLAN OF CARE
Problem: Physical Therapy - Adult  Goal: By Discharge: Performs mobility at highest level of function for planned discharge setting.  See evaluation for individualized goals.  Description: FUNCTIONAL STATUS PRIOR TO ADMISSION: Patient was independent and active without use of DME. Reports no falls lately.     HOME SUPPORT PRIOR TO ADMISSION: Resides at [facility].     Physical Therapy Goals  Initiated 5/19/2024  1.  Patient will move from supine to sit and sit to supine, scoot up and down, and roll side to side in bed with contact guard assist within 4 day(s).    2.  Patient will perform sit to stand with contact guard assist within 4 day(s).  3.  Patient will transfer from bed to chair and chair to bed with contact guard assist using the least restrictive device within 4 day(s).  4.  Patient will ambulate with contact guard assist for 50 feet with the least restrictive device within 4 day(s).   5.  Patient will perform posterior hip home exercise program per protocol with supervision/set-up within 4 days.  6. Patient will verbalize and demonstrate understanding of avoiding sudden and extreme movement within 4 days.   7. Patient will demonstrate understanding/maintain weight bearing as tolerated status during functional mobility within 4 days.      Outcome: Progressing   PHYSICAL THERAPY TREATMENT    Patient: Roland Bennett (64 y.o. male)  Date: 5/22/2024  Diagnosis: Closed left hip fracture, initial encounter ([facility]) [S72.002A] Closed left hip fracture, initial encounter ([facility])  Procedure(s) (LRB):  LEFT HIP HEMIARTHROPLASTY (POSTERIOR).LEFT FEMUR HARDWARE REMOVAL (Left) 4 Days Post-Op  Precautions: Fall Risk (WBAT L)                      ASSESSMENT:  Patient continues to benefit from skilled PT services and is progressing towards goals. Patient eager to work with therapy today after missing morning session due to several tests.  Overall doing better with managing LLE during bed mobility, still needs CGA to

## 2024-05-22 NOTE — PROGRESS NOTES
0800: Report received on pt. Pt had just gone from bed to bedside commode successfully, only able to pass gas, no BM. When returning to bed pts left hip dressing was partially removed. Cleansed incision with saline and gauze. Incision has sanguinous fluid coming from surgical site. New dressing applied with date and time. New dressing applied to neck mass.    0856: IVF discontinued and disconnected from patient.    1130: Pt taken to endo for modified barium swallow test with this RN.    1230: Back up to the floor from barium swallow test. VSS.    2000: Bedside shift change report given to SILVIANO Noguera (oncoming nurse) by SILVIANO Louis (offgoing nurse). Report included the following information Nurse Handoff Report.

## 2024-05-22 NOTE — PLAN OF CARE
SPEECH PATHOLOGY MODIFIED BARIUM SWALLOW STUDY  Patient: Roland Bennett (64 y.o. male)  Date: 5/22/2024  Primary Diagnosis: Closed left hip fracture, initial encounter ([facility]) [S72.002A]  Procedure(s) (LRB):  LEFT HIP HEMIARTHROPLASTY (POSTERIOR).LEFT FEMUR HARDWARE REMOVAL (Left) 4 Days Post-Op   Precautions:    Fall Risk (WBAT L)                  ASSESSMENT :  OF note, patient seated at 45 degree angle throughout study due to hip precautions, per patient and RN. Patient demonstrates adequate bolus extraction from spoon and straw. Prolonged mastication of solids due to lack of dentition. Decreased laryngeal vestibular closure during the swallow resulting in penetration of thin liquids. There is decreased amplitude and duration of UES opening, due to what appears to be anatomical changes with fullness around the UES and a narrowing below. This leads to residue of all consistencies in the pyriform sinuses. The patient is generally sensate to this residue and able to clear quickly with a second swallow; however, there is spillover of thin liquids towards the airway which is aspirated during the second swallow. Aspiration is silent. The patient was able to clear aspirated material with a cued cough. Head turn to the R did not improve clearance through the UES. Head turn to the L did improve function, though minimally.     Patient will benefit from skilled intervention to address the above impairments.     PLAN :  Recommendations and Planned Interventions:  Diet: Minced and moist and thin liquids  Throat clear every 3-4 sips      Acute SLP Services: Yes, patient will be followed by speech-language pathology 2x/week to address goals. Patient's rehabilitation potential is considered to be Fair.  Discharge Recommendations: Continue to assess pending progress     SUBJECTIVE:   Patient stated “Y'all don't have Koolaid down here? This is a hospital!”.    OBJECTIVE:   History reviewed. No pertinent past medical history.  Past  Surgical History:   Procedure Laterality Date    HIP SURGERY Left 5/18/2024    LEFT HIP HEMIARTHROPLASTY (POSTERIOR).LEFT FEMUR HARDWARE REMOVAL performed by Flakito Merrill MD at [facility] MAIN OR     Prior Level of Function/Home Situation:    Social/Functional History  Lives With:  ([facility])  Type of Home: Facility  Bathroom Accessibility: Walker accessible  Receives Help From: Other (comment) (staff at the facility)  Ambulation Assistance: Independent  Transfer Assistance: Needs assistance (walker)  Active : No  Occupation: On disability  Diet prior to admission: unknown  Current Diet:  minced and moist with thins                   Completed trials of thin, puree, solids  Adequate bolus acceptance of all trials. Prolonged chew  Pharyngeal swallow initiation with the bolus head in the pyriforms  Pyriform residue of all consistencies  Penetration and aspiration of thin liquids due to decreased closure and pyriform residue.   Aspiration silent, but cleared with a cued cough                      COMMUNICATION/EDUCATION:   Patient was educated regarding MBS results and strategy of throat clear every few sips.     The patient's plan of care including recommendations, planned interventions, and recommended diet changes were discussed with: Registered nurse    Patient understands intent and goals of therapy, but is neutral about his/her participation    Thank you,  Jennifer Matute SLP  Minutes: 42         Problem: SLP Adult - Impaired Swallowing  Goal: By Discharge: Advance to least restrictive diet without signs or symptoms of aspiration for planned discharge setting.  See evaluation for individualized goals.  Description: Speech Therapy Goals  Initiated 5/21/2024    1. Patient will participate in further objective imaging of the swallow within 7 days.   Outcome: Progressing

## 2024-05-22 NOTE — PLAN OF CARE
Problem: Discharge Planning  Goal: Discharge to home or other facility with appropriate resources  5/22/2024 0139 by Poornima Haines RN  Outcome: Progressing  Flowsheets (Taken 5/21/2024 2000)  Discharge to home or other facility with appropriate resources: Identify barriers to discharge with patient and caregiver  5/21/2024 1224 by Trinidad Castillo RN  Outcome: Progressing     Problem: Pain  Goal: Verbalizes/displays adequate comfort level or baseline comfort level  5/22/2024 0139 by Poornima Haines RN  Outcome: Progressing  5/21/2024 1224 by Trinidad Castillo RN  Outcome: Progressing     Problem: Skin/Tissue Integrity  Goal: Absence of new skin breakdown  Description: 1.  Monitor for areas of redness and/or skin breakdown  2.  Assess vascular access sites hourly  3.  Every 4-6 hours minimum:  Change oxygen saturation probe site  4.  Every 4-6 hours:  If on nasal continuous positive airway pressure, respiratory therapy assess nares and determine need for appliance change or resting period.  5/22/2024 0139 by Poornima Haines RN  Outcome: Progressing  5/21/2024 1224 by Trinidad Castillo RN  Outcome: Progressing     Problem: ABCDS Injury Assessment  Goal: Absence of physical injury  5/22/2024 0139 by Poornima Haines RN  Outcome: Progressing  5/21/2024 1224 by Trinidad Castillo RN  Outcome: Progressing     Problem: Safety - Adult  Goal: Free from fall injury  5/22/2024 0139 by Poornima Haines RN  Outcome: Progressing  5/21/2024 1224 by Trinidad Castillo RN  Outcome: Progressing     Problem: Occupational Therapy - Adult  Goal: By Discharge: Performs self-care activities at highest level of function for planned discharge setting.  See evaluation for individualized goals.  Description: FUNCTIONAL STATUS PRIOR TO ADMISSION:  Patient resided in [facility] for 2 years, able to complete dressing and bathing without assistance.    Receives Help From: Other (comment) (staff at the facility),  ,  ,  ,  ,  ,  ,  , Ambulation Assistance: Needs  demonstrate understanding/maintain weight bearing as tolerated status during functional mobility within 4 days.      5/21/2024 1426 by Litzy Perez, PT  Outcome: Progressing     Problem: SLP Adult - Impaired Swallowing  Goal: By Discharge: Advance to least restrictive diet without signs or symptoms of aspiration for planned discharge setting.  See evaluation for individualized goals.  Description: Speech Therapy Goals  Initiated 5/21/2024    1. Patient will participate in further objective imaging of the swallow within 7 days.   5/21/2024 1509 by Kary Gordillo, SLP  Outcome: Progressing  5/21/2024 1507 by Kary Gordillo, SLP  Outcome: Progressing

## 2024-05-22 NOTE — CARE COORDINATION
Transition of Care Plan:    RUR: 11%  Prior Level of Functioning: from LTC at [facility] (needing assistance in all adl's  Disposition: SNF recommended per IDR.  Patient has [employer] Medicaid and no snf benefit.  Referral will need to be sent to IPR.    Referral sent via EPIC 5/17/24.  CM called Ashwini Clifford (Liaison) and advised that recs were to return to SNF.    If SNF or IPR: Date FOC offered:   Date FOC received:   Accepting facility:   Date authorization started with reference number:   Date authorization received and expires:   Follow up appointments:   DME needed:   Transportation at discharge:   [facility] Medicare/ letter given:   Is patient a [place] and connected with VA?    If yes, was  transfer form completed and VA notified?   Caregiver Contact:   Discharge Caregiver contacted prior to discharge?   Care Conference needed?   Barriers to discharge:      CM continuing to follow.    ABDIRIZAK Bay, CRM  [phone]

## 2024-05-22 NOTE — PROGRESS NOTES
Bedside shift change report given to Missy RN (oncoming nurse) by Poornima RN (offgoing nurse). Report included the following information Nurse Handoff Report.

## 2024-05-22 NOTE — PROGRESS NOTES
Physical Therapy    Attempted to see patient for morning session.  Patient currently [facility] for MBS.  Will follow up for afternoon as able and appropriate.    Tricia Mosqueda MS, PT

## 2024-05-22 NOTE — PROGRESS NOTES
Occupational Therapy   05.22.2024    Chart reviewed in prep for OT treatment, patient being transported [facility] for MBS. Will defer and follow up as able and medically appropriate. Thank you.     @[id number] - patient just starting lunch. Will defer and follow up.     Jazmine Tilley MS, OTR/L

## 2024-05-23 LAB
ALBUMIN SERPL-MCNC: 2.2 G/DL (ref 3.5–5)
ALBUMIN/GLOB SERPL: 0.5 (ref 1.1–2.2)
ALP SERPL-CCNC: 142 U/L (ref 45–117)
ALT SERPL-CCNC: 32 U/L (ref 12–78)
ANION GAP SERPL CALC-SCNC: 5 MMOL/L (ref 5–15)
AST SERPL-CCNC: 30 U/L (ref 15–37)
BASOPHILS # BLD: 0.1 K/UL (ref 0–0.1)
BASOPHILS NFR BLD: 1 % (ref 0–1)
BILIRUB SERPL-MCNC: 0.6 MG/DL (ref 0.2–1)
BUN SERPL-MCNC: 7 MG/DL (ref 6–20)
BUN/CREAT SERPL: 9 (ref 12–20)
CALCIUM SERPL-MCNC: 8.7 MG/DL (ref 8.5–10.1)
CHLORIDE SERPL-SCNC: 103 MMOL/L (ref 97–108)
CO2 SERPL-SCNC: 27 MMOL/L (ref 21–32)
CREAT SERPL-MCNC: 0.81 MG/DL (ref 0.7–1.3)
DIFFERENTIAL METHOD BLD: ABNORMAL
EOSINOPHIL # BLD: 0.7 K/UL (ref 0–0.4)
EOSINOPHIL NFR BLD: 12 % (ref 0–7)
ERYTHROCYTE [DISTWIDTH] IN BLOOD BY AUTOMATED COUNT: 14.9 % (ref 11.5–14.5)
GLOBULIN SER CALC-MCNC: 4.4 G/DL (ref 2–4)
GLUCOSE SERPL-MCNC: 129 MG/DL (ref 65–100)
HCT VFR BLD AUTO: 22.4 % (ref 36.6–50.3)
HCT VFR BLD AUTO: 22.9 % (ref 36.6–50.3)
HCT VFR BLD AUTO: 23.7 % (ref 36.6–50.3)
HGB BLD-MCNC: 7.5 G/DL (ref 12.1–17)
HGB BLD-MCNC: 8 G/DL (ref 12.1–17)
HGB BLD-MCNC: 8.2 G/DL (ref 12.1–17)
IMM GRANULOCYTES # BLD AUTO: 0 K/UL (ref 0–0.04)
IMM GRANULOCYTES NFR BLD AUTO: 1 % (ref 0–0.5)
LYMPHOCYTES # BLD: 0.8 K/UL (ref 0.8–3.5)
LYMPHOCYTES NFR BLD: 15 % (ref 12–49)
MCH RBC QN AUTO: 31.3 PG (ref 26–34)
MCHC RBC AUTO-ENTMCNC: 33.5 G/DL (ref 30–36.5)
MCV RBC AUTO: 93.3 FL (ref 80–99)
MONOCYTES # BLD: 0.7 K/UL (ref 0–1)
MONOCYTES NFR BLD: 13 % (ref 5–13)
NEUTS SEG # BLD: 3.4 K/UL (ref 1.8–8)
NEUTS SEG NFR BLD: 58 % (ref 32–75)
NRBC # BLD: 0 K/UL (ref 0–0.01)
NRBC BLD-RTO: 0 PER 100 WBC
PLATELET # BLD AUTO: 184 K/UL (ref 150–400)
PMV BLD AUTO: 9.5 FL (ref 8.9–12.9)
POTASSIUM SERPL-SCNC: 3.8 MMOL/L (ref 3.5–5.1)
PROT SERPL-MCNC: 6.6 G/DL (ref 6.4–8.2)
RBC # BLD AUTO: 2.4 M/UL (ref 4.1–5.7)
SODIUM SERPL-SCNC: 135 MMOL/L (ref 136–145)
WBC # BLD AUTO: 5.7 K/UL (ref 4.1–11.1)

## 2024-05-23 PROCEDURE — 2580000003 HC RX 258: Performed by: FAMILY MEDICINE

## 2024-05-23 PROCEDURE — 6370000000 HC RX 637 (ALT 250 FOR IP): Performed by: ORTHOPAEDIC SURGERY

## 2024-05-23 PROCEDURE — 2060000000 HC ICU INTERMEDIATE R&B

## 2024-05-23 PROCEDURE — 97530 THERAPEUTIC ACTIVITIES: CPT

## 2024-05-23 PROCEDURE — 36415 COLL VENOUS BLD VENIPUNCTURE: CPT

## 2024-05-23 PROCEDURE — 97607 NEG PRS WND THR NDME<=50SQCM: CPT

## 2024-05-23 PROCEDURE — 6370000000 HC RX 637 (ALT 250 FOR IP): Performed by: FAMILY MEDICINE

## 2024-05-23 PROCEDURE — 6370000000 HC RX 637 (ALT 250 FOR IP): Performed by: PHYSICIAN ASSISTANT

## 2024-05-23 PROCEDURE — 85014 HEMATOCRIT: CPT

## 2024-05-23 PROCEDURE — 85025 COMPLETE CBC W/AUTO DIFF WBC: CPT

## 2024-05-23 PROCEDURE — 99024 POSTOP FOLLOW-UP VISIT: CPT | Performed by: PHYSICIAN ASSISTANT

## 2024-05-23 PROCEDURE — 85018 HEMOGLOBIN: CPT

## 2024-05-23 PROCEDURE — 97116 GAIT TRAINING THERAPY: CPT

## 2024-05-23 PROCEDURE — 80053 COMPREHEN METABOLIC PANEL: CPT

## 2024-05-23 RX ADMIN — Medication 100 MG: at 08:32

## 2024-05-23 RX ADMIN — OXYCODONE 10 MG: 5 TABLET ORAL at 22:26

## 2024-05-23 RX ADMIN — ASPIRIN 81 MG CHEWABLE TABLET 81 MG: 81 TABLET CHEWABLE at 20:12

## 2024-05-23 RX ADMIN — SODIUM CHLORIDE, PRESERVATIVE FREE 10 ML: 5 INJECTION INTRAVENOUS at 08:32

## 2024-05-23 RX ADMIN — ASPIRIN 81 MG CHEWABLE TABLET 81 MG: 81 TABLET CHEWABLE at 08:32

## 2024-05-23 RX ADMIN — ACETAMINOPHEN 650 MG: 325 TABLET ORAL at 06:30

## 2024-05-23 RX ADMIN — THERA TABS 1 TABLET: TAB at 08:32

## 2024-05-23 RX ADMIN — ACETAMINOPHEN 650 MG: 325 TABLET ORAL at 11:14

## 2024-05-23 RX ADMIN — ACETAMINOPHEN 650 MG: 325 TABLET ORAL at 18:37

## 2024-05-23 RX ADMIN — OXYCODONE 10 MG: 5 TABLET ORAL at 18:38

## 2024-05-23 RX ADMIN — OXYCODONE 10 MG: 5 TABLET ORAL at 06:56

## 2024-05-23 RX ADMIN — ACETAMINOPHEN 650 MG: 325 TABLET ORAL at 23:49

## 2024-05-23 RX ADMIN — Medication 1 MG: at 08:32

## 2024-05-23 ASSESSMENT — PAIN SCALES - GENERAL
PAINLEVEL_OUTOF10: 7
PAINLEVEL_OUTOF10: 6
PAINLEVEL_OUTOF10: 5
PAINLEVEL_OUTOF10: 5
PAINLEVEL_OUTOF10: 8
PAINLEVEL_OUTOF10: 9
PAINLEVEL_OUTOF10: 6
PAINLEVEL_OUTOF10: 5
PAINLEVEL_OUTOF10: 8
PAINLEVEL_OUTOF10: 7

## 2024-05-23 ASSESSMENT — PAIN DESCRIPTION - LOCATION
LOCATION: HIP

## 2024-05-23 ASSESSMENT — PAIN DESCRIPTION - ORIENTATION
ORIENTATION: LEFT

## 2024-05-23 ASSESSMENT — PAIN DESCRIPTION - DESCRIPTORS
DESCRIPTORS: ACHING
DESCRIPTORS: ACHING

## 2024-05-23 ASSESSMENT — PAIN - FUNCTIONAL ASSESSMENT
PAIN_FUNCTIONAL_ASSESSMENT: ACTIVITIES ARE NOT PREVENTED
PAIN_FUNCTIONAL_ASSESSMENT: ACTIVITIES ARE NOT PREVENTED

## 2024-05-23 NOTE — PROGRESS NOTES
Ortho Daily Progress Note      Patient: Roland Bennett                   MRN: [id number]  Sex: male  YOB: 1960           Age: 64 y.o.    5 Days Post-Op    Procedure(s):  LEFT HIP HEMIARTHROPLASTY (POSTERIOR).LEFT FEMUR HARDWARE REMOVAL    Subjective: Feeling much better, walking well with therapy, pain controlled, left hip dressings were changed this morning, looking forward to returning home to [facility]     Vitals:    05/23/24 1142   BP: 125/61   Pulse: 85   Resp: 18   Temp: 98.3 °F (36.8 °C)   SpO2: 99%        Lab Results:  Hemoglobin   Date/Time Value Ref Range Status   05/23/2024 04:40 AM 7.5 (L) 12.1 - 17.0 g/dL Final     INR   Date/Time Value Ref Range Status   05/17/2024 01:17 AM 1.0 0.9 - 1.1   Final     Comment:     A single therapeutic range for Vit K antagonists may not be optimal for all indications - see June, 2008 issue of Chest, American College of Chest Physicians Evidence-Based Clinical Practice Guidelines, 8th Edition.       Physical Exam:    GENERAL: 65yo male,alert, appears stated age, cooperative, and no distress  DRESSING: Aquacel dressings left knee and left lateral hip. Patient states left hip dressings were changed this morning. There is drainage evident on anterior knee dressing and most proximal hip dressing  SWELLING: mild swelling left hip and thigh as expected  NEUROLOGICAL: intact  VASCULAR: 2+ DP pulse  WOUND: dressing removed from proximal left hip, incision intact with staples in place, serosanguinous drainage noted  MOTION: no pain with gentle passive ROM left hip  DVT Exam: no evidence of DVT seen on physical exam.      Plan:    Discussed with Dr. Merrill  Continued post-op anemia, expected, HgB 7.5 today, transfuse as needed  Will consult wound care for consideration of wound vac application to left hip wound in light of ongoing drainage  PT/OT, WBAT  Aspirin 81mg bid x 28 days for DVT prophylaxis  Plan to return to [facility] once medically stable  Follow-up in 3

## 2024-05-23 NOTE — PLAN OF CARE
Problem: Discharge Planning  Goal: Discharge to home or other facility with appropriate resources  5/23/2024 0810 by Missy Lloyd RN  Outcome: Progressing  Flowsheets (Taken 5/23/2024 0800)  Discharge to home or other facility with appropriate resources: Identify barriers to discharge with patient and caregiver  5/23/2024 0339 by Poornima Haines RN  Outcome: Progressing  Flowsheets (Taken 5/22/2024 2000)  Discharge to home or other facility with appropriate resources: Identify barriers to discharge with patient and caregiver     Problem: Pain  Goal: Verbalizes/displays adequate comfort level or baseline comfort level  5/23/2024 0810 by Missy Lloyd RN  Outcome: Progressing  5/23/2024 0339 by Poornima Haines RN  Outcome: Progressing     Problem: Skin/Tissue Integrity  Goal: Absence of new skin breakdown  Description: 1.  Monitor for areas of redness and/or skin breakdown  2.  Assess vascular access sites hourly  3.  Every 4-6 hours minimum:  Change oxygen saturation probe site  4.  Every 4-6 hours:  If on nasal continuous positive airway pressure, respiratory therapy assess nares and determine need for appliance change or resting period.  5/23/2024 0810 by Missy Lloyd RN  Outcome: Progressing  5/23/2024 0339 by Poornima Haines RN  Outcome: Progressing     Problem: ABCDS Injury Assessment  Goal: Absence of physical injury  5/23/2024 0810 by Missy Lloyd RN  Outcome: Progressing  5/23/2024 0339 by Poornima Haines RN  Outcome: Progressing     Problem: Safety - Adult  Goal: Free from fall injury  5/23/2024 0810 by Missy Lloyd RN  Outcome: Progressing  5/23/2024 0339 by Poornima Haines RN  Outcome: Progressing

## 2024-05-23 NOTE — PROGRESS NOTES
Bedside shift change report given to SILVIANO Noguera (oncoming nurse) by SILVIANO Louis (offgoing nurse). Report included the following information Nurse Handoff Report.      Problem: Patient Care Overview  Goal: Plan of Care Review  Outcome: Ongoing (interventions implemented as appropriate)    No acute events throughout day. See vital signs and assessments in flowsheets. See below for updates on today's progress.     Pulmonary: SpO2 stable on room air, lungs clear and equal, pt has not respiratory complaints.    Cardiovascular: Pt remains in NSR in 70's, BP stable throughout shift with MAP remaining above 65, no complaints of CP/SOB.    Neurological: Pt GCS 15, no focal neuro deficit, moving all extremities spontaneously, no fever.    Gastrointestinal: Pt tolerating PO well, eating 50% of meals. Pt not complaining of abd pain, nausea, vomiting, or diarrhea.    Genitourinary: Sun removed, voiding without difficulty.     Endocrine: Pt titrated off insulin drip, transferred to SQ insulin with all following CBG's 240's-270's    Integumentary/Other: Skin intact, no evidence of breakdown.    Infusions: No current infusions. R IJ present without complications    Patient progressing towards goals as tolerated, plan of care communicated and reviewed with Bhumi Siegel and family. All concerns addressed. Will continue to monitor.

## 2024-05-23 NOTE — PLAN OF CARE
Problem: Occupational Therapy - Adult  Goal: By Discharge: Performs self-care activities at highest level of function for planned discharge setting.  See evaluation for individualized goals.  Description: FUNCTIONAL STATUS PRIOR TO ADMISSION:  Patient resided in [facility] for 2 years, able to complete dressing and bathing without assistance.    Receives Help From: Other (comment) (staff at the facility),  Ambulation Assistance: Needs assistance, Transfer Assistance: Needs assistance (walker), Active : No         HOME SUPPORT: Patient lived in [facility].     Occupational Therapy Goals  Initiated 5/19/2024    1. Patient will perform lower body dressing with AE PRN with Minimal Assist within 7 day(s).  2. Patient will perform upper body ADLS standing for 5 minutes without fatigue or LOB with Contact Guard Assist within 7 days.  3. Patient will perform toilet transfers with Contact Guard Assist using Bedside Commode Over Toilet within 7 days.  4. Patient will perform all aspects of toileting at Contact Guard Assist within 7 days.  5. Patient will utilize energy conservation techniques during functional activities without cues within 7 day(s).      Outcome: Progressing     OCCUPATIONAL THERAPY TREATMENT  Patient: Roland Bennett (64 y.o. male)  Date: 5/23/2024  Primary Diagnosis: Closed left hip fracture, initial encounter ([facility]) [S72.002A]  Procedure(s) (LRB):  LEFT HIP HEMIARTHROPLASTY (POSTERIOR).LEFT FEMUR HARDWARE REMOVAL (Left) 5 Days Post-Op   Precautions: Fall Risk (WBAT L)                Chart, occupational therapy assessment, plan of care, and goals were reviewed.    ASSESSMENT  Patient continues to benefit from skilled OT services and is progressing towards goals. Pt cleared for therapy by nursing and received semi-fowlers in bed agreeable to therapy, demo'ing good adherence to precautions throughout session. Pt completed bed mobility to sit EOB with Min A for LLE management. Stood with RW and SBA for  distress sitting up in chair, Call bell within reach, and Bed/ chair alarm activated    COMMUNICATION/EDUCATION:   The patient's plan of care was discussed with: physical therapist and registered nurse         Thank you for this referral.  Judit Montelongo OT  Minutes: 11

## 2024-05-23 NOTE — WOUND CARE
Consulted by Luther CARRILLO, for possible incisional NPWT to left hip.   He is POD 5 s/p left hip hemiarthroplasty and removal of deep hardware  Silver fiber dressing with moderate amount of serosanguinous exudate that was placed this morning.   Potential for discharge tomorrow and Prevena Plus incisional VAC dressing was placed.     Management instructions for Prevena Plus reviewed with RN and patient.  Written instructions as follows were given to him for discharge:    -This is an INCISIONAL “band-aid” under suction - no open wound and easily peels off at end of therapy.    -Dressing stays on for 7 days days with uninterrupted therapy; stays under suction 24/7 - do NOT turn it off.    -The pump has a rechargeable battery like a cell phone and must be plugged in to recharge.      -The [place] of lights will count down the days of therapy before the pump turns off automatically.     -At end of therapy, remove the dressing and cover incision with dry gauze until follow up appt with physician; dispose of dressing and pump.    -An extra canister was provided and can be exchanged when needed.    -You may add tape to the outside edge if it rolls up.     -Patient may shower with dressing - face the water stream; blot dressing dry with towel rather than rubbing which may roll up tape edges.    -Please reach out to provider/surgeon's office for concerns with incision.    An extra canister was also given to him.     MYRA Mon

## 2024-05-23 NOTE — PROGRESS NOTES
lightheadedness.   -S/p 1L LR  - CTA Chest: No evidence of PE, Trace R pleural effusion and pleural nodularity along the posterior superior aspect of the right major fissure. Partially imaged right supraclavicular cystic mass with mural nodularity  measuring 7 x 4.2 cm.   - Lactic acid WNL  - BMP grossly  -Bradycardic to the 40s on 5/21.  TSH slightly elevated 3.94.  No recurrence.  Echocardiogram normal.    Acute blood loss anemia  - Hgb 8.4 --> 7.7. Patient has dropped 3 grams of hemoglobin post-operatively. Suspect anemia is driving this S/p 1U PRBCs   -Hb stable, 8.2 today.             ETOH Dependence without evidence of withdrawal as of 5/20  - Alcohol level upon arrival in   - CIWA with as needed Ativan  - Thiamine plus folate plus MVI     Hyponatremia  - Likely 2/2 EtOH intake, poor solute intake  - Na improved 135 today.     Throat cancer  Dysphagia  -Status post chemoradiation  - Port left upper chest  - Hx of trach   - Patient reports difficulty with swallowing from his baseline, evaluated by speech and he is put on dysphagia diet, minced and moist.           Code status: FUll  Prophylaxis: SCDs, aspirin per Ortho  Care Plan discussed with: Patient, RN, IDR team.  Anticipated Disposition: [facility], today or tomorrow.           Review of Systems:   Pertinent items are noted in HPI.       Vital Signs:    Last 24hrs VS reviewed since prior progress note. Most recent are:  Vitals:    05/23/24 1512   BP: 133/72   Pulse: 87   Resp: 16   Temp: 98.5 °F (36.9 °C)   SpO2: 99%         Intake/Output Summary (Last 24 hours) at 5/23/2024 1605  Last data filed at 5/23/2024 1200  Gross per 24 hour   Intake 260 ml   Output 1250 ml   Net -990 ml          Physical Examination:     I had a face to face encounter with this patient and independently examined them on 5/23/2024 as outlined below:          Constitutional:  No acute distress, cooperative, pleasant    ENT:  Oral mucosa moist, oropharynx benign.    Resp:  CTA      ______________________________________________________________________  EXPECTED LENGTH OF STAY: 7  ACTUAL LENGTH OF STAY:          6                 Olena Olivarez MD

## 2024-05-23 NOTE — PLAN OF CARE
posterior hip home exercise program per protocol with supervision/set-up within 4 days.  6. Patient will verbalize and demonstrate understanding of avoiding sudden and extreme movement within 4 days.   7. Patient will demonstrate understanding/maintain weight bearing as tolerated status during functional mobility within 4 days.      5/22/2024 1522 by Tricia Mosqueda, PT  Outcome: Progressing

## 2024-05-23 NOTE — CARE COORDINATION
Transition of Care Plan:    RUR: 9%  Prior Level of Functioning: LTC    Disposition: Patient can return to [facility] and receive \"restorative rehab\".  CM noted wound vac placed today.  Plan for d/c Friday?  CM will notify re need for wound vac.    Transportation at discharge: bls/stretcher  IM/IMM Medicare/ letter given: n/a  Is patient a  and connected with [facility]? N/a  If yes, was [place] transfer form completed and [facility] notified? N/a    Caregiver Contact:   Discharge Caregiver contacted prior to discharge? no  Care Conference needed? no  Barriers to discharge:      ABDIRIZAK Bay, CRM  [phone]

## 2024-05-23 NOTE — PLAN OF CARE
Problem: Physical Therapy - Adult  Goal: By Discharge: Performs mobility at highest level of function for planned discharge setting.  See evaluation for individualized goals.  Description: FUNCTIONAL STATUS PRIOR TO ADMISSION: Patient was independent and active without use of DME. Reports no falls lately.     HOME SUPPORT PRIOR TO ADMISSION: Resides at [facility].     Physical Therapy Goals  Goals re-assessed 5/23/2024   1.  Patient will move from supine to sit and sit to supine, scoot up and down, and roll side to side in bed with supervision within 7 day(s).    2.  Patient will perform sit to stand with supervision within 7 day(s).  3.  Patient will transfer from bed to chair and chair to bed with supervision using the least restrictive device within 7 day(s).  4.  Patient will ambulate with supervision for 50 feet with the least restrictive device within 7 day(s).   5.  Patient will perform posterior hip home exercise program per protocol with supervision/set-up within 7 days.  Initiated 5/19/2024  1.  Patient will move from supine to sit and sit to supine, scoot up and down, and roll side to side in bed with contact guard assist within 4 day(s).    2.  Patient will perform sit to stand with contact guard assist within 4 day(s).  3.  Patient will transfer from bed to chair and chair to bed with contact guard assist using the least restrictive device within 4 day(s).  4.  Patient will ambulate with contact guard assist for 50 feet with the least restrictive device within 4 day(s).   5.  Patient will perform posterior hip home exercise program per protocol with supervision/set-up within 4 days.  6. Patient will verbalize and demonstrate understanding of avoiding sudden and extreme movement within 4 days. - Goal met 5/23/2024   7. Patient will demonstrate understanding/maintain weight bearing as tolerated status during functional mobility within 4 days.- Goal met 5/23/2024   Outcome: Progressing  PHYSICAL  gait    Activity Tolerance:   Fair     After treatment:   Patient left in no apparent distress sitting up in chair, Call bell within reach, and Bed/ chair alarm activated    COMMUNICATION/EDUCATION:   The patient's plan of care was discussed with: occupational therapist and registered nurse    Thank you for this referral.  Delicia Herbert, PT, DPT  Minutes: 15

## 2024-05-24 VITALS
SYSTOLIC BLOOD PRESSURE: 145 MMHG | HEIGHT: 73 IN | HEART RATE: 92 BPM | TEMPERATURE: 99.1 F | DIASTOLIC BLOOD PRESSURE: 70 MMHG | RESPIRATION RATE: 20 BRPM | WEIGHT: 162.04 LBS | BODY MASS INDEX: 21.48 KG/M2 | OXYGEN SATURATION: 100 %

## 2024-05-24 LAB
ABO + RH BLD: NORMAL
ANTIGENS PRESENT RBC DONR: NORMAL
BLD PROD TYP BPU: NORMAL
BLOOD BANK CMNT PATIENT-IMP: NORMAL
BLOOD BANK DISPENSE STATUS: NORMAL
BLOOD GROUP ANTIBODIES SERPL: NORMAL
BLOOD GROUP ANTIBODIES SERPL: NORMAL
BPU ID: NORMAL
CROSSMATCH RESULT: NORMAL
DAT POLY-SP REAG RBC QL: NORMAL
HCT VFR BLD AUTO: 24.2 % (ref 36.6–50.3)
HGB BLD-MCNC: 8.5 G/DL (ref 12.1–17)
SPECIMEN EXP DATE BLD: NORMAL
UNIT DIVISION: 0

## 2024-05-24 PROCEDURE — 6370000000 HC RX 637 (ALT 250 FOR IP): Performed by: FAMILY MEDICINE

## 2024-05-24 PROCEDURE — 85018 HEMOGLOBIN: CPT

## 2024-05-24 PROCEDURE — 97116 GAIT TRAINING THERAPY: CPT

## 2024-05-24 PROCEDURE — 6370000000 HC RX 637 (ALT 250 FOR IP): Performed by: PHYSICIAN ASSISTANT

## 2024-05-24 PROCEDURE — 6370000000 HC RX 637 (ALT 250 FOR IP): Performed by: ORTHOPAEDIC SURGERY

## 2024-05-24 PROCEDURE — 97530 THERAPEUTIC ACTIVITIES: CPT

## 2024-05-24 PROCEDURE — 85014 HEMATOCRIT: CPT

## 2024-05-24 PROCEDURE — 36415 COLL VENOUS BLD VENIPUNCTURE: CPT

## 2024-05-24 RX ORDER — FOLIC ACID 1 MG/1
1 TABLET ORAL DAILY
Qty: 30 TABLET | Refills: 3 | Status: SHIPPED
Start: 2024-05-25

## 2024-05-24 RX ORDER — ASPIRIN 81 MG/1
81 TABLET, CHEWABLE ORAL 2 TIMES DAILY
Qty: 56 TABLET | Refills: 0 | Status: SHIPPED
Start: 2024-05-24 | End: 2024-06-21

## 2024-05-24 RX ORDER — SENNOSIDES A AND B 8.6 MG/1
1 TABLET, FILM COATED ORAL DAILY PRN
Qty: 30 TABLET | Refills: 0 | Status: SHIPPED
Start: 2024-05-24 | End: 2024-06-23

## 2024-05-24 RX ORDER — LANOLIN ALCOHOL/MO/W.PET/CERES
100 CREAM (GRAM) TOPICAL DAILY
Qty: 30 TABLET | Refills: 3 | Status: SHIPPED
Start: 2024-05-25

## 2024-05-24 RX ADMIN — Medication 100 MG: at 08:27

## 2024-05-24 RX ADMIN — ACETAMINOPHEN 650 MG: 325 TABLET ORAL at 05:24

## 2024-05-24 RX ADMIN — Medication 1 MG: at 08:26

## 2024-05-24 RX ADMIN — ACETAMINOPHEN 650 MG: 325 TABLET ORAL at 12:13

## 2024-05-24 RX ADMIN — ASPIRIN 81 MG CHEWABLE TABLET 81 MG: 81 TABLET CHEWABLE at 08:27

## 2024-05-24 RX ADMIN — THERA TABS 1 TABLET: TAB at 08:26

## 2024-05-24 RX ADMIN — OXYCODONE 5 MG: 5 TABLET ORAL at 08:26

## 2024-05-24 ASSESSMENT — PAIN DESCRIPTION - LOCATION
LOCATION: HIP
LOCATION: HIP

## 2024-05-24 ASSESSMENT — PAIN SCALES - GENERAL
PAINLEVEL_OUTOF10: 5
PAINLEVEL_OUTOF10: 6
PAINLEVEL_OUTOF10: 6

## 2024-05-24 ASSESSMENT — PAIN DESCRIPTION - DESCRIPTORS
DESCRIPTORS: ACHING
DESCRIPTORS: ACHING

## 2024-05-24 ASSESSMENT — PAIN DESCRIPTION - ORIENTATION: ORIENTATION: LEFT

## 2024-05-24 ASSESSMENT — PAIN - FUNCTIONAL ASSESSMENT: PAIN_FUNCTIONAL_ASSESSMENT: ACTIVITIES ARE NOT PREVENTED

## 2024-05-24 NOTE — PROGRESS NOTES
Bedside shift change report given to Kelly RN (oncoming nurse) by Poornima RN (offgoing nurse). Report included the following information Nurse Handoff Report.

## 2024-05-24 NOTE — PROGRESS NOTES
1145: Report called to Dany. Report given to SILVIANO Perdomo. Wheelchair transport set up for 1230.   Patient received a discharge order. The patient was made aware of the discharge and agreed to going home. The patient's IV was removed without any bleeding or complications. The patient is discharging with a wound vac and has been educated.The patient was given discharge instructions which included new, changed, and discontinued medication, where they can  their prescriptions, side effects of new medications, opioid safety, follow up appointments, signs and symptoms of heart attack and stroke and when to seek emergency care, and how to access Mychart. The patient's belongings were all packed up and the patient verified they had everything that belongs to them. The patient was picked up by [facility] by wheelchair and discharged. Vital signs were all stable at the time of discharge.

## 2024-05-24 NOTE — CARE COORDINATION
Transition of Care Plan:    RUR: 8%  Prior Level of Functioning: LTC  Disposition: return to [facility] today with wound vac  Patient to return to facility with disposable wound vac attached. CM called facility earlier this morning and spoke with James (admissions [phone]).  Facility requesting 11am transport and patient to admit to bed 138A.    Wheelchair transport arranged via [facility] for 12:30pm transport.  Discharge packet placed on hard chart.     SILVIANO Mendoza to call report to [phone]    Follow up appointments:   DME needed: none  Transportation at discharge: wheelchair    Caregiver Contact:   Discharge Caregiver contacted prior to discharge?   Care Conference needed?   Barriers to discharge:  none.    ABDIRIZAK Bay, CRM  [phone]

## 2024-05-24 NOTE — PLAN OF CARE
Problem: Discharge Planning  Goal: Discharge to home or other facility with appropriate resources  5/24/2024 1207 by Kelly Thomas RN  Outcome: Completed  5/24/2024 1016 by Kelly Thomas RN  Outcome: Progressing  Flowsheets (Taken 5/24/2024 0800)  Discharge to home or other facility with appropriate resources:   Identify barriers to discharge with patient and caregiver   Arrange for needed discharge resources and transportation as appropriate  5/24/2024 0015 by Poornima Haines RN  Outcome: Progressing  Flowsheets (Taken 5/23/2024 2000)  Discharge to home or other facility with appropriate resources: Identify barriers to discharge with patient and caregiver     Problem: Pain  Goal: Verbalizes/displays adequate comfort level or baseline comfort level  5/24/2024 1207 by Kelly Thomas RN  Outcome: Completed  5/24/2024 1016 by Kelly Thomas RN  Outcome: Progressing  Flowsheets (Taken 5/24/2024 0800)  Verbalizes/displays adequate comfort level or baseline comfort level: Encourage patient to monitor pain and request assistance  5/24/2024 0015 by Poornima Haines RN  Outcome: Progressing     Problem: Skin/Tissue Integrity  Goal: Absence of new skin breakdown  Description: 1.  Monitor for areas of redness and/or skin breakdown  2.  Assess vascular access sites hourly  3.  Every 4-6 hours minimum:  Change oxygen saturation probe site  4.  Every 4-6 hours:  If on nasal continuous positive airway pressure, respiratory therapy assess nares and determine need for appliance change or resting period.  5/24/2024 1207 by Kelly Thomas RN  Outcome: Completed  5/24/2024 1016 by Kelly Thomas RN  Outcome: Progressing  5/24/2024 0015 by Poornima Haines RN  Outcome: Progressing     Problem: ABCDS Injury Assessment  Goal: Absence of physical injury  5/24/2024 1207 by Kelly Thomas RN  Outcome: Completed  5/24/2024 1016 by Kelly Thomas RN  Outcome: Progressing  5/24/2024 0015 by Poornima Haines RN  Outcome: Progressing     Problem: Safety -  Adult  Goal: Free from fall injury  5/24/2024 1207 by Kelly Thomas RN  Outcome: Completed  5/24/2024 1016 by Kelly Thomas RN  Outcome: Progressing  5/24/2024 0015 by Poornima Haines RN  Outcome: Progressing     Problem: Physical Therapy - Adult  Goal: By Discharge: Performs mobility at highest level of function for planned discharge setting.  See evaluation for individualized goals.  Description: FUNCTIONAL STATUS PRIOR TO ADMISSION: Patient was independent and active without use of DME. Reports no falls lately.     HOME SUPPORT PRIOR TO ADMISSION: Resides at [facility].     Physical Therapy Goals  Goals re-assessed 5/23/2024   1.  Patient will move from supine to sit and sit to supine, scoot up and down, and roll side to side in bed with supervision within 7 day(s).    2.  Patient will perform sit to stand with supervision within 7 day(s).  3.  Patient will transfer from bed to chair and chair to bed with supervision using the least restrictive device within 7 day(s).  4.  Patient will ambulate with supervision for 50 feet with the least restrictive device within 7 day(s).   5.  Patient will perform posterior hip home exercise program per protocol with supervision/set-up within 7 days.  Initiated 5/19/2024  1.  Patient will move from supine to sit and sit to supine, scoot up and down, and roll side to side in bed with contact guard assist within 4 day(s).    2.  Patient will perform sit to stand with contact guard assist within 4 day(s).  3.  Patient will transfer from bed to chair and chair to bed with contact guard assist using the least restrictive device within 4 day(s).  4.  Patient will ambulate with contact guard assist for 50 feet with the least restrictive device within 4 day(s).   5.  Patient will perform posterior hip home exercise program per protocol with supervision/set-up within 4 days.  6. Patient will verbalize and demonstrate understanding of avoiding sudden and extreme movement within 4 days.

## 2024-05-24 NOTE — DISCHARGE SUMMARY
Discharge Summary       PATIENT ID: Roland Bennett  MRN: [id number]   YOB: 1960    DATE OF ADMISSION: 5/17/2024 12:12 AM    DATE OF DISCHARGE: 05/24/24     PRIMARY CARE PROVIDER: No primary care provider on file.     ATTENDING PHYSICIAN: Olena Olivarez MD    DISCHARGING PROVIDER: Olena Olivarez MD    To contact this individual call [phone] and ask the  to page.  If unavailable ask to be transferred the Adult Hospitalist Department.    CONSULTATIONS: IP CONSULT TO ORTHOPEDIC SURGERY  IP CONSULT TO SOCIAL WORK  IP CONSULT TO ORTHOPEDIC SURGERY  IP WOUND CARE NURSE CONSULT TO EVAL    PROCEDURES/SURGERIES: Procedure(s):  LEFT HIP HEMIARTHROPLASTY (POSTERIOR).LEFT FEMUR HARDWARE REMOVAL     ADMITTING DIAGNOSES & HOSPITAL COURSE:   Admission Summary:   Roland Bennett is a 64 y.o. male with a past medical history of alcohol dependence, anemia, dysphagia, malnutrition, and throat cancer presented after ground-level fall at [employer].  Patient reportedly had been drinking alcohol, tripped and fell in the parking lot at [employer], and injured his left hip. Upon arrival to the ED, he was found to have a left hip fracture. He states that he does not drink daily, but did have 2 drinks consisting of a 40 ounce of beer, and a 24 ounce beers that day.        DISCHARGE DIAGNOSES / PLAN:    Left Femoral Neck Fracture patient underwent Left femur removal of deep hardware through 4 separate incisions, left hip hemiarthroplasty by posterior approach on 5/18/2024.  Due to increased in discharge from the left hip surgical wound, wound vacuum was applied on 5/23/2024.  Patient is cleared for discharge by orthopedics.  DVT prophylaxis with aspirin 81 mg twice daily x 28 days.  Follow-up with orthopedics, Dr. Merrill in 2-3 weeks.  Continue PT and OT eval and treat.     Sinus tachycardia, improved most probably due to severe acute blood loss anemia.  CT angiogram of the chest ruled out PE or other acute  other signs or symptoms that you may have questions about.    DISPOSITION:    Home With:   OT  PT  HH  RN      x Long term SNF/Inpatient Rehab    Independent/assisted living    Hospice    Other:       PATIENT CONDITION AT DISCHARGE:     Functional status    Poor    x Deconditioned     Independent      Cognition    x Lucid     Forgetful     Dementia      Catheters/lines (plus indication)    Weber     PICC     PEG    x None      Code status    x Full code     DNR      PHYSICAL EXAMINATION AT DISCHARGE:    General : alert x 3, awake, no acute distress,   HEENT: Right neck hard mass/known malignancy  Neck: supple, no JVD, no meningeal signs  Chest: Clear to auscultation bilaterally   CVS: S1 S2 heard, Capillary refill less than 2 seconds  Abd: soft/ Non tender, non distended, BS physiological,   Ext: Left hip dressing with wound vac  Neuro/Psych: pleasant mood and affect, CN 2-12 grossly intact, sensory grossly within normal limit, Strength 5/5 in all extremities  Skin: warm     CHRONIC MEDICAL DIAGNOSES:  Principal Problem:    Closed left hip fracture, initial encounter ([facility])  Resolved Problems:    * No resolved hospital problems. *        Greater than 31 minutes were spent with the patient on counseling and coordination of care    Signed:   Olena Olivarez MD  5/24/2024  10:04 AM

## 2024-05-24 NOTE — PLAN OF CARE
Problem: Discharge Planning  Goal: Discharge to home or other facility with appropriate resources  Outcome: Progressing  Flowsheets (Taken 5/23/2024 2000)  Discharge to home or other facility with appropriate resources: Identify barriers to discharge with patient and caregiver     Problem: Pain  Goal: Verbalizes/displays adequate comfort level or baseline comfort level  Outcome: Progressing     Problem: Skin/Tissue Integrity  Goal: Absence of new skin breakdown  Description: 1.  Monitor for areas of redness and/or skin breakdown  2.  Assess vascular access sites hourly  3.  Every 4-6 hours minimum:  Change oxygen saturation probe site  4.  Every 4-6 hours:  If on nasal continuous positive airway pressure, respiratory therapy assess nares and determine need for appliance change or resting period.  Outcome: Progressing     Problem: ABCDS Injury Assessment  Goal: Absence of physical injury  Outcome: Progressing     Problem: Safety - Adult  Goal: Free from fall injury  Outcome: Progressing     Problem: Occupational Therapy - Adult  Goal: By Discharge: Performs self-care activities at highest level of function for planned discharge setting.  See evaluation for individualized goals.  Description: FUNCTIONAL STATUS PRIOR TO ADMISSION:  Patient resided in [facility] for 2 years, able to complete dressing and bathing without assistance.    Receives Help From: Other (comment) (staff at the facility),  ,  ,  ,  ,  ,  ,  , Ambulation Assistance: Needs assistance, Transfer Assistance: Needs assistance (walker), Active : No         HOME SUPPORT: Patient lived in [facility].     Occupational Therapy Goals  Initiated 5/19/2024    1. Patient will perform lower body dressing with AE PRN with Minimal Assist within 7 day(s).  2. Patient will perform upper body ADLS standing for 5 minutes without fatigue or LOB with Contact Guard Assist within 7 days.  3. Patient will perform toilet transfers with Contact Guard Assist using  days.  6. Patient will verbalize and demonstrate understanding of avoiding sudden and extreme movement within 4 days. - Goal met 5/23/2024   7. Patient will demonstrate understanding/maintain weight bearing as tolerated status during functional mobility within 4 days.- Goal met 5/23/2024 5/23/2024 1254 by Delicia Herbert, PT  Outcome: Progressing  5/23/2024 1252 by Delicia Herbert, PT  Outcome: Progressing

## 2024-05-24 NOTE — PLAN OF CARE
Problem: Physical Therapy - Adult  Goal: By Discharge: Performs mobility at highest level of function for planned discharge setting.  See evaluation for individualized goals.  Description: FUNCTIONAL STATUS PRIOR TO ADMISSION: Patient was independent and active without use of DME. Reports no falls lately.     HOME SUPPORT PRIOR TO ADMISSION: Resides at [facility].     Physical Therapy Goals  Goals re-assessed 5/23/2024   1.  Patient will move from supine to sit and sit to supine, scoot up and down, and roll side to side in bed with supervision within 7 day(s).    2.  Patient will perform sit to stand with supervision within 7 day(s).  3.  Patient will transfer from bed to chair and chair to bed with supervision using the least restrictive device within 7 day(s).  4.  Patient will ambulate with supervision for 50 feet with the least restrictive device within 7 day(s).   5.  Patient will perform posterior hip home exercise program per protocol with supervision/set-up within 7 days.  Initiated 5/19/2024  1.  Patient will move from supine to sit and sit to supine, scoot up and down, and roll side to side in bed with contact guard assist within 4 day(s).    2.  Patient will perform sit to stand with contact guard assist within 4 day(s).  3.  Patient will transfer from bed to chair and chair to bed with contact guard assist using the least restrictive device within 4 day(s).  4.  Patient will ambulate with contact guard assist for 50 feet with the least restrictive device within 4 day(s).   5.  Patient will perform posterior hip home exercise program per protocol with supervision/set-up within 4 days.  6. Patient will verbalize and demonstrate understanding of avoiding sudden and extreme movement within 4 days. - Goal met 5/23/2024   7. Patient will demonstrate understanding/maintain weight bearing as tolerated status during functional mobility within 4 days.- Goal met 5/23/2024   Outcome: Progressing  PHYSICAL

## 2024-05-24 NOTE — PLAN OF CARE
Problem: Discharge Planning  Goal: Discharge to home or other facility with appropriate resources  5/24/2024 1016 by Kelly Thomas RN  Outcome: Progressing  Flowsheets (Taken 5/24/2024 0800)  Discharge to home or other facility with appropriate resources:   Identify barriers to discharge with patient and caregiver   Arrange for needed discharge resources and transportation as appropriate  5/24/2024 0015 by Poornima Haines RN  Outcome: Progressing  Flowsheets (Taken 5/23/2024 2000)  Discharge to home or other facility with appropriate resources: Identify barriers to discharge with patient and caregiver     Problem: Pain  Goal: Verbalizes/displays adequate comfort level or baseline comfort level  5/24/2024 1016 by Kelly Thomas RN  Outcome: Progressing  Flowsheets (Taken 5/24/2024 0800)  Verbalizes/displays adequate comfort level or baseline comfort level: Encourage patient to monitor pain and request assistance  5/24/2024 0015 by Poornima Haines RN  Outcome: Progressing     Problem: Skin/Tissue Integrity  Goal: Absence of new skin breakdown  Description: 1.  Monitor for areas of redness and/or skin breakdown  2.  Assess vascular access sites hourly  3.  Every 4-6 hours minimum:  Change oxygen saturation probe site  4.  Every 4-6 hours:  If on nasal continuous positive airway pressure, respiratory therapy assess nares and determine need for appliance change or resting period.  5/24/2024 1016 by Kelly Thomas RN  Outcome: Progressing  5/24/2024 0015 by Poornima Haines RN  Outcome: Progressing     Problem: ABCDS Injury Assessment  Goal: Absence of physical injury  5/24/2024 1016 by Kelly Thomas RN  Outcome: Progressing  5/24/2024 0015 by Poornima Haines RN  Outcome: Progressing     Problem: Safety - Adult  Goal: Free from fall injury  5/24/2024 1016 by Kelly Thomas RN  Outcome: Progressing  5/24/2024 0015 by Poornima Haines RN  Outcome: Progressing     Problem: Physical Therapy - Adult  Goal: By Discharge: Performs mobility

## 2024-10-04 ENCOUNTER — ANESTHESIA (OUTPATIENT)
Facility: HOSPITAL | Age: 64
End: 2024-10-04
Payer: MEDICAID

## 2024-10-04 ENCOUNTER — ANESTHESIA EVENT (OUTPATIENT)
Facility: HOSPITAL | Age: 64
End: 2024-10-04
Payer: MEDICAID

## 2024-10-04 ENCOUNTER — HOSPITAL ENCOUNTER (OUTPATIENT)
Facility: HOSPITAL | Age: 64
Setting detail: OUTPATIENT SURGERY
Discharge: HOME | End: 2024-10-04
Attending: PODIATRIST | Admitting: INTERNAL MEDICINE
Payer: MEDICAID

## 2024-10-04 VITALS
WEIGHT: 162 LBS | SYSTOLIC BLOOD PRESSURE: 122 MMHG | DIASTOLIC BLOOD PRESSURE: 77 MMHG | BODY MASS INDEX: 21.38 KG/M2 | RESPIRATION RATE: 17 BRPM | OXYGEN SATURATION: 97 % | TEMPERATURE: 97.7 F | HEART RATE: 78 BPM

## 2024-10-04 PROCEDURE — 7100000011 HC PHASE II RECOVERY - ADDTL 15 MIN: Performed by: PODIATRIST

## 2024-10-04 PROCEDURE — 7100000000 HC PACU RECOVERY - FIRST 15 MIN: Performed by: PODIATRIST

## 2024-10-04 PROCEDURE — 7100000001 HC PACU RECOVERY - ADDTL 15 MIN: Performed by: PODIATRIST

## 2024-10-04 PROCEDURE — 3700000000 HC ANESTHESIA ATTENDED CARE: Performed by: PODIATRIST

## 2024-10-04 PROCEDURE — 2580000003 HC RX 258: Performed by: PODIATRIST

## 2024-10-04 PROCEDURE — 3600000012 HC SURGERY LEVEL 2 ADDTL 15MIN: Performed by: PODIATRIST

## 2024-10-04 PROCEDURE — 3600000002 HC SURGERY LEVEL 2 BASE: Performed by: PODIATRIST

## 2024-10-04 PROCEDURE — 7100000010 HC PHASE II RECOVERY - FIRST 15 MIN: Performed by: PODIATRIST

## 2024-10-04 PROCEDURE — 3700000001 HC ADD 15 MINUTES (ANESTHESIA): Performed by: PODIATRIST

## 2024-10-04 PROCEDURE — 2709999900 HC NON-CHARGEABLE SUPPLY: Performed by: PODIATRIST

## 2024-10-04 PROCEDURE — 6360000002 HC RX W HCPCS: Performed by: NURSE ANESTHETIST, CERTIFIED REGISTERED

## 2024-10-04 PROCEDURE — 6360000002 HC RX W HCPCS: Performed by: PODIATRIST

## 2024-10-04 PROCEDURE — 2500000003 HC RX 250 WO HCPCS: Performed by: NURSE ANESTHETIST, CERTIFIED REGISTERED

## 2024-10-04 RX ORDER — BUPIVACAINE HYDROCHLORIDE 5 MG/ML
INJECTION, SOLUTION EPIDURAL; INTRACAUDAL PRN
Status: DISCONTINUED | OUTPATIENT
Start: 2024-10-04 | End: 2024-10-04 | Stop reason: HOSPADM

## 2024-10-04 RX ORDER — POLYETHYLENE GLYCOL 3350 17 G/17G
17 POWDER, FOR SOLUTION ORAL DAILY PRN
COMMUNITY

## 2024-10-04 RX ORDER — OXYCODONE HYDROCHLORIDE 5 MG/1
5 TABLET ORAL PRN
Status: DISCONTINUED | OUTPATIENT
Start: 2024-10-04 | End: 2024-10-04 | Stop reason: HOSPADM

## 2024-10-04 RX ORDER — HYDRALAZINE HYDROCHLORIDE 20 MG/ML
10 INJECTION INTRAMUSCULAR; INTRAVENOUS
Status: DISCONTINUED | OUTPATIENT
Start: 2024-10-04 | End: 2024-10-04 | Stop reason: HOSPADM

## 2024-10-04 RX ORDER — SODIUM CHLORIDE 0.9 % (FLUSH) 0.9 %
5-40 SYRINGE (ML) INJECTION EVERY 12 HOURS SCHEDULED
Status: DISCONTINUED | OUTPATIENT
Start: 2024-10-04 | End: 2024-10-04 | Stop reason: HOSPADM

## 2024-10-04 RX ORDER — PHENYLEPHRINE HCL IN 0.9% NACL 1 MG/10 ML
SYRINGE (ML) INTRAVENOUS
Status: DISCONTINUED | OUTPATIENT
Start: 2024-10-04 | End: 2024-10-04 | Stop reason: SDUPTHER

## 2024-10-04 RX ORDER — DEXMEDETOMIDINE HYDROCHLORIDE 100 UG/ML
INJECTION, SOLUTION INTRAVENOUS
Status: DISCONTINUED | OUTPATIENT
Start: 2024-10-04 | End: 2024-10-04 | Stop reason: SDUPTHER

## 2024-10-04 RX ORDER — SODIUM CHLORIDE 0.9 % (FLUSH) 0.9 %
5-40 SYRINGE (ML) INJECTION PRN
Status: DISCONTINUED | OUTPATIENT
Start: 2024-10-04 | End: 2024-10-04 | Stop reason: HOSPADM

## 2024-10-04 RX ORDER — SODIUM CHLORIDE 9 MG/ML
INJECTION, SOLUTION INTRAVENOUS PRN
Status: DISCONTINUED | OUTPATIENT
Start: 2024-10-04 | End: 2024-10-04 | Stop reason: HOSPADM

## 2024-10-04 RX ORDER — DIPHENHYDRAMINE HYDROCHLORIDE 50 MG/ML
12.5 INJECTION INTRAMUSCULAR; INTRAVENOUS
Status: DISCONTINUED | OUTPATIENT
Start: 2024-10-04 | End: 2024-10-04 | Stop reason: HOSPADM

## 2024-10-04 RX ORDER — MULTIVITAMIN WITH IRON
1 TABLET ORAL DAILY
COMMUNITY

## 2024-10-04 RX ORDER — FAMOTIDINE 20 MG/1
20 TABLET, FILM COATED ORAL 2 TIMES DAILY
COMMUNITY

## 2024-10-04 RX ORDER — METOCLOPRAMIDE HYDROCHLORIDE 5 MG/ML
10 INJECTION INTRAMUSCULAR; INTRAVENOUS
Status: DISCONTINUED | OUTPATIENT
Start: 2024-10-04 | End: 2024-10-04 | Stop reason: HOSPADM

## 2024-10-04 RX ORDER — LIDOCAINE 4 G/G
1 PATCH TOPICAL AS NEEDED
Status: DISCONTINUED | OUTPATIENT
Start: 2024-10-04 | End: 2024-10-04 | Stop reason: HOSPADM

## 2024-10-04 RX ORDER — THIAMINE MONONITRATE (VIT B1) 100 MG
100 TABLET ORAL DAILY
COMMUNITY

## 2024-10-04 RX ORDER — PROPOFOL 10 MG/ML
INJECTION, EMULSION INTRAVENOUS
Status: DISCONTINUED | OUTPATIENT
Start: 2024-10-04 | End: 2024-10-04 | Stop reason: SDUPTHER

## 2024-10-04 RX ORDER — FENTANYL CITRATE 50 UG/ML
50 INJECTION, SOLUTION INTRAMUSCULAR; INTRAVENOUS EVERY 5 MIN PRN
Status: DISCONTINUED | OUTPATIENT
Start: 2024-10-04 | End: 2024-10-04 | Stop reason: HOSPADM

## 2024-10-04 RX ORDER — ONDANSETRON 2 MG/ML
4 INJECTION INTRAMUSCULAR; INTRAVENOUS
Status: DISCONTINUED | OUTPATIENT
Start: 2024-10-04 | End: 2024-10-04 | Stop reason: HOSPADM

## 2024-10-04 RX ORDER — MIDAZOLAM HYDROCHLORIDE 1 MG/ML
INJECTION INTRAMUSCULAR; INTRAVENOUS
Status: DISCONTINUED | OUTPATIENT
Start: 2024-10-04 | End: 2024-10-04 | Stop reason: SDUPTHER

## 2024-10-04 RX ORDER — ONDANSETRON 2 MG/ML
4 INJECTION INTRAMUSCULAR; INTRAVENOUS EVERY 6 HOURS PRN
Status: DISCONTINUED | OUTPATIENT
Start: 2024-10-04 | End: 2024-10-04 | Stop reason: HOSPADM

## 2024-10-04 RX ORDER — ONDANSETRON 4 MG/1
4 TABLET, ORALLY DISINTEGRATING ORAL EVERY 8 HOURS PRN
Status: DISCONTINUED | OUTPATIENT
Start: 2024-10-04 | End: 2024-10-04 | Stop reason: HOSPADM

## 2024-10-04 RX ORDER — MEPERIDINE HYDROCHLORIDE 25 MG/ML
12.5 INJECTION INTRAMUSCULAR; INTRAVENOUS; SUBCUTANEOUS EVERY 5 MIN PRN
Status: DISCONTINUED | OUTPATIENT
Start: 2024-10-04 | End: 2024-10-04 | Stop reason: HOSPADM

## 2024-10-04 RX ORDER — SODIUM CHLORIDE, SODIUM LACTATE, POTASSIUM CHLORIDE, CALCIUM CHLORIDE 600; 310; 30; 20 MG/100ML; MG/100ML; MG/100ML; MG/100ML
INJECTION, SOLUTION INTRAVENOUS ONCE
Status: DISCONTINUED | OUTPATIENT
Start: 2024-10-04 | End: 2024-10-04 | Stop reason: HOSPADM

## 2024-10-04 RX ORDER — OXYCODONE HYDROCHLORIDE 5 MG/1
10 TABLET ORAL PRN
Status: DISCONTINUED | OUTPATIENT
Start: 2024-10-04 | End: 2024-10-04 | Stop reason: HOSPADM

## 2024-10-04 RX ORDER — SODIUM CHLORIDE, SODIUM LACTATE, POTASSIUM CHLORIDE, CALCIUM CHLORIDE 600; 310; 30; 20 MG/100ML; MG/100ML; MG/100ML; MG/100ML
INJECTION, SOLUTION INTRAVENOUS CONTINUOUS
Status: DISCONTINUED | OUTPATIENT
Start: 2024-10-04 | End: 2024-10-04 | Stop reason: HOSPADM

## 2024-10-04 RX ORDER — LABETALOL HYDROCHLORIDE 5 MG/ML
10 INJECTION, SOLUTION INTRAVENOUS
Status: DISCONTINUED | OUTPATIENT
Start: 2024-10-04 | End: 2024-10-04 | Stop reason: HOSPADM

## 2024-10-04 RX ORDER — FENTANYL CITRATE 50 UG/ML
INJECTION, SOLUTION INTRAMUSCULAR; INTRAVENOUS
Status: DISCONTINUED | OUTPATIENT
Start: 2024-10-04 | End: 2024-10-04 | Stop reason: SDUPTHER

## 2024-10-04 RX ORDER — HYDROMORPHONE HYDROCHLORIDE 1 MG/ML
0.5 INJECTION, SOLUTION INTRAMUSCULAR; INTRAVENOUS; SUBCUTANEOUS EVERY 5 MIN PRN
Status: DISCONTINUED | OUTPATIENT
Start: 2024-10-04 | End: 2024-10-04 | Stop reason: HOSPADM

## 2024-10-04 RX ORDER — GLUCAGON 1 MG/ML
1 KIT INJECTION PRN
Status: DISCONTINUED | OUTPATIENT
Start: 2024-10-04 | End: 2024-10-04 | Stop reason: HOSPADM

## 2024-10-04 RX ORDER — SENNA AND DOCUSATE SODIUM 50; 8.6 MG/1; MG/1
1 TABLET, FILM COATED ORAL DAILY
COMMUNITY

## 2024-10-04 RX ORDER — NALOXONE HYDROCHLORIDE 0.4 MG/ML
INJECTION, SOLUTION INTRAMUSCULAR; INTRAVENOUS; SUBCUTANEOUS PRN
Status: DISCONTINUED | OUTPATIENT
Start: 2024-10-04 | End: 2024-10-04 | Stop reason: HOSPADM

## 2024-10-04 RX ORDER — LORAZEPAM 2 MG/ML
0.5 INJECTION INTRAMUSCULAR
Status: DISCONTINUED | OUTPATIENT
Start: 2024-10-04 | End: 2024-10-04 | Stop reason: HOSPADM

## 2024-10-04 RX ORDER — HEPARIN 100 UNIT/ML
500 SYRINGE INTRAVENOUS ONCE
Status: DISCONTINUED | OUTPATIENT
Start: 2024-10-04 | End: 2024-10-04 | Stop reason: HOSPADM

## 2024-10-04 RX ORDER — IPRATROPIUM BROMIDE AND ALBUTEROL SULFATE 2.5; .5 MG/3ML; MG/3ML
1 SOLUTION RESPIRATORY (INHALATION)
Status: DISCONTINUED | OUTPATIENT
Start: 2024-10-04 | End: 2024-10-04 | Stop reason: HOSPADM

## 2024-10-04 RX ORDER — DEXTROSE MONOHYDRATE 100 MG/ML
INJECTION, SOLUTION INTRAVENOUS CONTINUOUS PRN
Status: DISCONTINUED | OUTPATIENT
Start: 2024-10-04 | End: 2024-10-04 | Stop reason: HOSPADM

## 2024-10-04 RX ADMIN — MIDAZOLAM HYDROCHLORIDE 2 MG: 1 INJECTION INTRAMUSCULAR; INTRAVENOUS at 09:53

## 2024-10-04 RX ADMIN — Medication 100 MCG: at 10:51

## 2024-10-04 RX ADMIN — Medication 100 MCG: at 10:43

## 2024-10-04 RX ADMIN — CEFAZOLIN 2000 MG: 1 INJECTION, POWDER, FOR SOLUTION INTRAMUSCULAR; INTRAVENOUS at 09:58

## 2024-10-04 RX ADMIN — FENTANYL CITRATE 50 MCG: 50 INJECTION, SOLUTION INTRAMUSCULAR; INTRAVENOUS at 10:15

## 2024-10-04 RX ADMIN — DEXMEDETOMIDINE HYDROCHLORIDE 6 MCG: 100 INJECTION, SOLUTION INTRAVENOUS at 10:19

## 2024-10-04 RX ADMIN — PROPOFOL 200 MCG/KG/MIN: 10 INJECTION, EMULSION INTRAVENOUS at 10:02

## 2024-10-04 RX ADMIN — PROPOFOL 50 MCG/KG/MIN: 10 INJECTION, EMULSION INTRAVENOUS at 10:13

## 2024-10-04 RX ADMIN — FENTANYL CITRATE 50 MCG: 50 INJECTION, SOLUTION INTRAMUSCULAR; INTRAVENOUS at 10:00

## 2024-10-04 RX ADMIN — SODIUM CHLORIDE, POTASSIUM CHLORIDE, SODIUM LACTATE AND CALCIUM CHLORIDE: 600; 310; 30; 20 INJECTION, SOLUTION INTRAVENOUS at 09:09

## 2024-10-04 ASSESSMENT — PAIN - FUNCTIONAL ASSESSMENT
PAIN_FUNCTIONAL_ASSESSMENT: NONE - DENIES PAIN

## 2024-10-04 NOTE — OP NOTE
Operative Note      Patient: Roland Bennett  YOB: 1960  MRN: [id number]    Date of Procedure: 10/4/2024    Pre-Op Diagnosis Codes:        * Degenerative arthritis of toe joint, right [M19.071]     * Metatarsalgia of right plantar fifth metatarsal head right foot [M77.41]    Post-Op Diagnosis: Same       Procedure(s):  PARTIAL AMPUTATION RIGHT HALLUX-72292  FIFTH METATARSAL HEAD RESECTION RIGHT FOOT-82582    Surgeon(s):  Pavan Hernandez MD    Assistant:   * No surgical staff found *    Anesthesia: Monitor Anesthesia Care    Estimated Blood Loss (mL): Minimal    Complications: None    Specimens:   * No specimens in log *    Implants:  * No implants in log *      Drains:   Negative Pressure Wound Therapy Hip Left (Active)       Findings:  Infection Present At Time Of Surgery (PATOS) (choose all levels that have infection present):  No infection present  Other Findings: None    Detailed Description of Procedure:   Patient was brought to the operating room and placed on the table in a supine position.  A well-padded ankle tourniquet was applied to the right ankle but not inflated at this time.  Under adequate IV sedation local block was achieved using 20 cc of 0.5% Marcaine plain.  The foot was prepped and draped with the usual sterile manner with the aid of an Esmarch bandage for exsanguination the tourniquet was then inflated to 250 mmHg.  Attention was directed to the dorsal aspect of the right hallux where a fishmouth style incision was made distal to the first MPJ.  The incision was deepened in the same plane.  All soft tissues were freed and attached from the mid portion of the proximal phalanx.  Using a sagittal saw an osteotomy from dorsal to plantar was made through the proximal phalanx.  The distal aspect of the hallux was freed from all soft tissue attachments and the extensor tendon and flexor tendons were pulled and cut at its most proximal aspect.  The distal toe was removed in toto.  The  cut was smooth without fragmentation.  The bone edges were rongeured and burred smooth.  The wound was copiously irrigated with sterile saline.  Skin was closed with 4-0 nylon.  Attention was then directed to the dorsal aspect of the right fifth MPJ where a 4 cm linear incision was made.  The incision was deepened in the same plane.  The capsule was incised in line with initial skin incision down to bone over the fifth MPJ exposing the head of the fifth metatarsal.  All soft tissues were freed and using a sagittal saw a mildly oblique osteotomy from dorsal to plantar was fashioned and the head of the fifth metatarsal was excised in toto.  The cut was smooth without fragmentation.  All areas were burred smooth.  Wound was amparo irrigated with sterile saline.  Capsule was closed with 3-0 Vicryl suture.  Deep and subcutaneous closure was obtained with 3-0 and 4-0 Vicryl suture and skin was closed with 4-0 nylon.  An Adaptic dry sterile dressing with a Marly and Ace were applied patient tolerated procedure and anesthesia well and left the operating room to recovery with vital signs stable and vascular status to the right lower extremity turn to normal Levels.  He received 2 g of Ancef IV PB along with postop oral and written instructions along with medications for pain and anti-inflammation.  He will follow-up in the office in 5-6 days.    Electronically signed by Pavan Hernandez MD on 10/4/2024 at 11:16 AM

## 2024-10-04 NOTE — PERIOP NOTE
0849: Called [facility] regarding patient medications. María Elena ANGELO stated he had all medications yesterday morning. States to call report back to her after procedure. Pt has been NPO after midnight. Pt is alert and oriented. Please call Racheal (good friend) after procedure just to update.  0900: Per Dr. Jagdish moreland to access his port after multiple IV attempts.   1145: Pt to [facility] for recovery. Pt stable and alert. VS taken. Pt declined beverage or snack. Pt ride called.   1224: VS taken, port de accessed. Pt ambulated to restroom.   1235: Patient discharged via wheelchair to [facility] transportation. Discharge instructions and medications reviewed/given. Patient verbalizes understanding. All questions answered. No distress noted, patient stable. Report called to Sandoval at [facility].

## 2024-10-04 NOTE — H&P
Patient: Roland Bennett MRN: [id number]  SSN: [id number]    YOB: 1960  Age: 64 y.o.  Sex: male      History and Physical    Roland Bennett is a 64 y.o. male having foot surgery.    Allergies: No Known Allergies     Chief Complaint: foot pain     History of Present Illness: 64 y.o. male here for foot surgery.    Past Medical History:   Diagnosis Date    Anemia     Dysphagia     Fracture of head of left femur (HCC)     Lack of coordination     Malignant neoplasm of pharynx (HCC)     Muscle weakness       Past Surgical History:   Procedure Laterality Date    HIP SURGERY Left 05/18/2024    LEFT HIP HEMIARTHROPLASTY (POSTERIOR).LEFT FEMUR HARDWARE REMOVAL performed by Flakito Merrill MD at [facility] MAIN OR      History reviewed. No pertinent family history.   Social History     Tobacco Use    Smoking status: Former     Types: Cigarettes    Smokeless tobacco: Never   Substance Use Topics    Alcohol use: Yes        Prior to Admission medications    Medication Sig Start Date End Date Taking? Authorizing Provider   famotidine (PEPCID) 20 MG tablet Take 1 tablet by mouth 2 times daily   Yes Crystal Berger MD   polyethylene glycol (GLYCOLAX) 17 g packet Take 1 packet by mouth daily as needed for Constipation   Yes Crystal Berger MD   Multiple Vitamin (MULTIVITAMIN) TABS tablet Take 1 tablet by mouth daily   Yes Crystal Berger MD   psyllium (KONSYL) 28.3 % PACK Take 1 packet by mouth daily   Yes Crystal Berger MD   sennosides-docusate sodium (SENOKOT-S) 8.6-50 MG tablet Take 1 tablet by mouth daily   Yes Crystal Berger MD   vitamin B-1 (THIAMINE) 100 MG tablet Take 1 tablet by mouth daily   Yes Crystal Berger MD   aspirin 81 MG chewable tablet Take 1 tablet by mouth in the morning and at bedtime for 28 days 5/24/24 10/4/24 Yes Olena Olivarez MD   folic acid (FOLVITE) 1 MG tablet Take 1 tablet by mouth daily 5/25/24  Yes Olena Olivarez MD   thiamine 100 MG tablet Take 1

## 2024-10-04 NOTE — ANESTHESIA PRE PROCEDURE
Department of Anesthesiology  Preprocedure Note       Name:  Roland Bennett   Age:  64 y.o.  :  1960                                          MRN:  [id number]         Date:  10/4/2024      Surgeon: Surgeon(s):  Pavan Hernandez MD    Procedure: Procedure(s):  PARTIAL AMPUTATION RIGHT HALLUX, FIFTH METATARSAL HEAD RESECTION RIGHT FOOT    Medications prior to admission:   Prior to Admission medications    Medication Sig Start Date End Date Taking? Authorizing Provider   famotidine (PEPCID) 20 MG tablet Take 1 tablet by mouth 2 times daily   Yes Crystal Berger MD   polyethylene glycol (GLYCOLAX) 17 g packet Take 1 packet by mouth daily as needed for Constipation   Yes Crystal Berger MD   Multiple Vitamin (MULTIVITAMIN) TABS tablet Take 1 tablet by mouth daily   Yes Crystal Berger MD   psyllium (KONSYL) 28.3 % PACK Take 1 packet by mouth daily   Yes Crystal Berger MD   sennosides-docusate sodium (SENOKOT-S) 8.6-50 MG tablet Take 1 tablet by mouth daily   Yes Crystal Berger MD   vitamin B-1 (THIAMINE) 100 MG tablet Take 1 tablet by mouth daily   Yes Crystal Berger MD   aspirin 81 MG chewable tablet Take 1 tablet by mouth in the morning and at bedtime for 28 days 5/24/24 10/4/24 Yes Olena Olivarez MD   folic acid (FOLVITE) 1 MG tablet Take 1 tablet by mouth daily 24  Yes Olena Olivarez MD   thiamine 100 MG tablet Take 1 tablet by mouth daily 24  Yes Olena Olivarez MD       Current medications:    Current Facility-Administered Medications   Medication Dose Route Frequency Provider Last Rate Last Admin    ceFAZolin (ANCEF) 2,000 mg in sterile water 20 mL IV syringe  2,000 mg IntraVENous Once Pavan Hernandez MD        lactated ringers IV soln infusion   IntraVENous Continuous Pavan Hernandez  mL/hr at 10/04/24 0909 New Bag at 10/04/24 0909    heparin (PF) 100 UNIT/ML injection 500 Units  500 Units IntraCATHeter Once Donaldo Dubon MD

## 2024-11-02 NOTE — ANESTHESIA POSTPROCEDURE EVALUATION
Department of Anesthesiology  Postprocedure Note    Patient: Roland Bennett  MRN: [id number]  YOB: 1960    Procedure Summary       Date: 10/04/24 Room / Location: [facility] MAIN OR 07 / [facility] MAIN OR    Anesthesia Start: 0953 Anesthesia Stop: 1119    Procedure: PARTIAL AMPUTATION RIGHT HALLUX, FIFTH METATARSAL HEAD RESECTION RIGHT FOOT (Right: Foot) Diagnosis:       Osteoarthritis of joint of toe of right foot      Degenerative arthritis of toe joint, right      Metatarsalgia of right foot      (Osteoarthritis of joint of toe of right foot [M19.071])      (Degenerative arthritis of toe joint, right [M19.071])      (Metatarsalgia of right foot [M77.41])    Surgeons: Pavan Hernandez MD Responsible Provider: Donaldo Dubon MD    Anesthesia Type: MAC ASA Status: 3            Anesthesia Type: MAC    Mable Phase I: Mable Score: 10    Mable Phase II: Mable Score: 10    Anesthesia Post Evaluation    Patient location during evaluation: PACU  Patient participation: complete - patient cannot participate  Level of consciousness: awake  Pain score: 0  Airway patency: patent  Nausea & Vomiting: no nausea and no vomiting  Cardiovascular status: hemodynamically stable  Respiratory status: acceptable  Hydration status: stable  Multimodal analgesia pain management approach        No notable events documented.

## (undated) DEVICE — SCRUBIN SCRUB BRUSH DRY STER: Brand: MEDLINE INDUSTRIES, INC.

## (undated) DEVICE — DRESSING PETRO W3XL3IN OIL EMUL N ADH GZ KNIT IMPREG CELOS

## (undated) DEVICE — SPONGE GZ W4XL4IN COT 12 PLY TYP VII WVN C FLD DSGN STERILE

## (undated) DEVICE — GARMENT,MEDLINE,DVT,INT,CALF,MED, GEN2: Brand: MEDLINE

## (undated) DEVICE — T-DRAPE,EXTREMITY,STERILE: Brand: MEDLINE

## (undated) DEVICE — DRESSING FOAM 4X8IN DISP POSTOP MEPILEX BORD AG

## (undated) DEVICE — DRESSING FOAM POST OPERATIVE 4X10 IN MEPILEX BORDER AG

## (undated) DEVICE — SUTURE NONABSORBABLE MONOFILAMENT 4-0 FS-2 18 IN ETHILON 662H

## (undated) DEVICE — SUTURE VICRYL + SZ 3-0 L27IN ABSRB UD FS2 3/8 CIR REV CUT

## (undated) DEVICE — SUTURE VICRYL + SZ 2-0 L27IN ABSRB WHT SH 1/2 CIR TAPERCUT VCP417H

## (undated) DEVICE — GLOVE SURG SZ 8 L12IN FNGR THK94MIL STD WHT LTX FREE

## (undated) DEVICE — SUTURE VICRYL 2-0 L27IN ABSRB CT BRAID COAT UD J275H

## (undated) DEVICE — 4.0MM EGG BUR

## (undated) DEVICE — BLADE SURG SAW SAG S STL AGG 90MM LEN 80MM CUT DEPTH 25.4MM

## (undated) DEVICE — PRECISION THIN (9.0 X 0.38 X 25.0MM)

## (undated) DEVICE — SOLUTION IRRIG 1000ML STRL H2O USP PLAS POUR BTL

## (undated) DEVICE — GLOVE SURG SZ 8 L12IN FNGR THK79MIL GRN LTX FREE

## (undated) DEVICE — UPPER EXTREMITY: Brand: MEDLINE INDUSTRIES, INC.

## (undated) DEVICE — GLOVE ORTHO 7 1/2   MSG9475

## (undated) DEVICE — SUTURE VICRYL COATED ABSORBABLE BRAIDED 2-0 TP1 54 IN COAT UD VICRYL + VCP880T

## (undated) DEVICE — DRESSING HYDROCOLLOID BORDER 35X10 IN ALUM PRIMASEAL

## (undated) DEVICE — SOLUTION WND IRRIGATION 450 ML 0.5 PVP-I 0.9 NACL

## (undated) DEVICE — SYRINGE MED 10ML LUERLOCK TIP W/O SFTY DISP

## (undated) DEVICE — PADDING UNDERCAST W4INXL12FT RAYON POLY SYN NONADHESIVE

## (undated) DEVICE — HYPODERMIC SAFETY NEEDLE: Brand: MONOJECT

## (undated) DEVICE — SUTURE VICRYL SZ 4-0 L18IN ABSRB UD L19MM PS-2 3/8 CIR PRIM J496H

## (undated) DEVICE — HEWSON SUTURE RETRIEVER: Brand: HEWSON SUTURE RETRIEVER

## (undated) DEVICE — 4-PORT MANIFOLD: Brand: NEPTUNE 2

## (undated) DEVICE — BLADE,CARBON-STEEL,15,STRL,DISPOSABLE,TB: Brand: MEDLINE

## (undated) DEVICE — GLOVE ORANGE PI 8   MSG9080

## (undated) DEVICE — PENCIL ES CRD L10FT HND SWCHING ROCK SWCH W/ EDGE COAT BLDE

## (undated) DEVICE — DRAPE SURG W41XL74IN CLR FULL SZ C ARM 3 ADH POLY STRP E

## (undated) DEVICE — SUTURE ETHIBOND EXCEL SZ 5 L30IN NONABSORBABLE GRN L40MM V-37 MB66G

## (undated) DEVICE — APPLICATOR MEDICATED 26 CC SOLUTION HI LT ORNG CHLORAPREP

## (undated) DEVICE — ZIMMER® STERILE DISPOSABLE TOURNIQUET CUFF WITH PLC, DUAL PORT, SINGLE BLADDER, 18 IN. (46 CM)

## (undated) DEVICE — SHOE POSTOP M FOR 9-11IN FOAM NYL MESH TRICOT LO PROF RIG

## (undated) DEVICE — SUTURE VICRYL 1 L27IN ABSRB CT BRAID COAT UD J281H

## (undated) DEVICE — TOTAL JOINT - SMH: Brand: MEDLINE INDUSTRIES, INC.

## (undated) DEVICE — SUTURE VICRYL ABSORBABLE BRAIDED 2-0 CT 36 IN DA UD  VCP957H

## (undated) DEVICE — DRILL BIT 2.0MM (5/64'') X 128.0MM

## (undated) DEVICE — LIQUIBAND RAPID ADHESIVE 36/CS 0.8ML: Brand: MEDLINE

## (undated) DEVICE — 6619 2 PTNT ISO SYS INCISE AREA&LT;(&GT;&&LT;)&GT;P: Brand: STERI-DRAPE™ IOBAN™ 2

## (undated) DEVICE — BASIC SINGLE BASIN-LF: Brand: MEDLINE INDUSTRIES, INC.

## (undated) DEVICE — BANDAGE COMPR W4INXL5YD WHT BGE POLY COT M E WRP WV HK AND

## (undated) DEVICE — 3.0MM ROUND FLUTED

## (undated) DEVICE — SUTURE MONOCRYL SZ 3-0 L27IN ABSRB UD L19MM PS-2 3/8 CIR PRIM Y427H

## (undated) DEVICE — SOLUTION IRRIG 1000ML 0.9% SOD CHL USP POUR PLAS BTL

## (undated) DEVICE — COVER,MAYO STAND,STERILE: Brand: MEDLINE

## (undated) DEVICE — PREP PAD BNS: Brand: CONVERTORS

## (undated) DEVICE — PAD BD MATTRESS 73X32 IN STD CONVOLUTED FOAM LTX FREE

## (undated) DEVICE — APPLICATOR PREP 26ML 0.7% IOD POVACRYLEX 74% ISO ALC ST

## (undated) DEVICE — SOLUTION SURG PREP 26 CC PURPREP

## (undated) DEVICE — DRAPE,REIN 53X77,STERILE: Brand: MEDLINE

## (undated) DEVICE — STERILE POLYISOPRENE POWDER-FREE SURGICAL GLOVES: Brand: PROTEXIS